# Patient Record
Sex: MALE | Race: BLACK OR AFRICAN AMERICAN | URBAN - METROPOLITAN AREA
[De-identification: names, ages, dates, MRNs, and addresses within clinical notes are randomized per-mention and may not be internally consistent; named-entity substitution may affect disease eponyms.]

---
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
## 2019-01-28 DIAGNOSIS — F90.9 ATTENTION DEFICIT HYPERACTIVITY DISORDER (ADHD), UNSPECIFIED ADHD TYPE: ICD-10-CM

## 2019-01-28 RX ORDER — DEXTROAMPHETAMINE SACCHARATE, AMPHETAMINE ASPARTATE MONOHYDRATE, DEXTROAMPHETAMINE SULFATE AND AMPHETAMINE SULFATE 7.5; 7.5; 7.5; 7.5 MG/1; MG/1; MG/1; MG/1
CAPSULE, EXTENDED RELEASE ORAL
Qty: 30 CAP | Refills: 0 | Status: SHIPPED | OUTPATIENT
Start: 2019-01-28 | End: 2019-02-28 | Stop reason: SDUPTHER

## 2019-01-28 NOTE — TELEPHONE ENCOUNTER
Called patient left name and call back number. Adderall refilled and prescription printed. Please call and the patient.

## 2019-02-28 DIAGNOSIS — F90.9 ATTENTION DEFICIT HYPERACTIVITY DISORDER (ADHD), UNSPECIFIED ADHD TYPE: ICD-10-CM

## 2019-02-28 RX ORDER — DEXTROAMPHETAMINE SACCHARATE, AMPHETAMINE ASPARTATE MONOHYDRATE, DEXTROAMPHETAMINE SULFATE AND AMPHETAMINE SULFATE 7.5; 7.5; 7.5; 7.5 MG/1; MG/1; MG/1; MG/1
CAPSULE, EXTENDED RELEASE ORAL
Qty: 30 CAP | Refills: 0 | Status: SHIPPED | OUTPATIENT
Start: 2019-02-28 | End: 2019-04-05 | Stop reason: SDUPTHER

## 2019-04-04 DIAGNOSIS — F90.9 ATTENTION DEFICIT HYPERACTIVITY DISORDER (ADHD), UNSPECIFIED ADHD TYPE: ICD-10-CM

## 2019-04-04 RX ORDER — DEXTROAMPHETAMINE SACCHARATE, AMPHETAMINE ASPARTATE MONOHYDRATE, DEXTROAMPHETAMINE SULFATE AND AMPHETAMINE SULFATE 7.5; 7.5; 7.5; 7.5 MG/1; MG/1; MG/1; MG/1
CAPSULE, EXTENDED RELEASE ORAL
Qty: 30 CAP | Refills: 0 | Status: CANCELLED | OUTPATIENT
Start: 2019-04-04

## 2019-04-04 NOTE — TELEPHONE ENCOUNTER
Requested Prescriptions Pending Prescriptions Disp Refills  amphetamine-dextroamphetamine XR (ADDERALL XR) 30 mg XR capsule 30 Cap 0 Sig: TAKE 1 CAPSULE BY MOUTH EVERY MORNING An appt has been scheduled to see Jada Solano 4/12

## 2019-04-05 RX ORDER — DEXTROAMPHETAMINE SACCHARATE, AMPHETAMINE ASPARTATE MONOHYDRATE, DEXTROAMPHETAMINE SULFATE AND AMPHETAMINE SULFATE 7.5; 7.5; 7.5; 7.5 MG/1; MG/1; MG/1; MG/1
CAPSULE, EXTENDED RELEASE ORAL
Qty: 30 CAP | Refills: 0 | Status: SHIPPED | OUTPATIENT
Start: 2019-04-05 | End: 2019-05-08 | Stop reason: SDUPTHER

## 2019-04-12 ENCOUNTER — OFFICE VISIT (OUTPATIENT)
Dept: FAMILY MEDICINE CLINIC | Age: 19
End: 2019-04-12

## 2019-04-12 VITALS
HEIGHT: 65 IN | RESPIRATION RATE: 20 BRPM | HEART RATE: 80 BPM | DIASTOLIC BLOOD PRESSURE: 81 MMHG | BODY MASS INDEX: 33.82 KG/M2 | WEIGHT: 203 LBS | SYSTOLIC BLOOD PRESSURE: 121 MMHG | OXYGEN SATURATION: 99 % | TEMPERATURE: 98.7 F

## 2019-04-12 DIAGNOSIS — F90.9 ATTENTION DEFICIT HYPERACTIVITY DISORDER (ADHD), UNSPECIFIED ADHD TYPE: Primary | ICD-10-CM

## 2019-04-12 NOTE — PROGRESS NOTES
Chief Complaint Patient presents with  Behavioral Problem 1. Have you been to the ER, urgent care clinic since your last visit? Hospitalized since your last visit?no 2. Have you seen or consulted any other health care providers outside of the [address] [place] [address] since your last visit? Include any pap smears or colon screening.  No

## 2019-04-12 NOTE — PROGRESS NOTES
PRANAV 
Hermes Rain. is a 22 y.o. male who presents today to establish care with new provider for treatment of ADHD follow up. Pt has been taking Adderall XR 30 mg daily in the morning and 5 mg in the afternoon as needed daily. Pt state he has been focusing at work and has not had any issues completing task. Denies chest pain, SOB, loss of appetite, rash and trouble sleeping. No refills needed today per patient. Current Outpatient Medications Medication Sig Dispense Refill  amphetamine-dextroamphetamine XR (ADDERALL XR) 30 mg XR capsule TAKE 1 CAPSULE BY MOUTH EVERY MORNING 30 Cap 0  
 dextroamphetamine-amphetamine (ADDERALL) 5 mg tablet TAKE 1 TABLET BY MOUTH AT 11:00AM 30 Tab 0 No Known Allergies SUBJECTIVE: 
Review of Systems Constitutional: Negative for chills, fever and malaise/fatigue. Eyes: Negative for blurred vision. Respiratory: Negative for shortness of breath. Cardiovascular: Negative for chest pain, palpitations and claudication. Gastrointestinal: Negative for abdominal pain, diarrhea, nausea and vomiting. Musculoskeletal: Negative for myalgias. Skin: Negative for rash. Neurological: Negative for dizziness, tingling, sensory change and headaches. Psychiatric/Behavioral: Negative for suicidal ideas. The patient is not nervous/anxious and does not have insomnia. OBJECTIVE: 
Visit Vitals /81 (BP 1 Location: Left arm, BP Patient Position: Sitting) Pulse 80 Temp 98.7 °F (37.1 °C) (Oral) Resp 20 Ht 5' 5\" (1.651 m) Wt 203 lb (92.1 kg) SpO2 99% BMI 33.78 kg/m² I have reviewed/discussed the above normal BMI with the patient. I have recommended the following interventions: dietary management education, guidance, and counseling, encourage exercise and monitor weight . Physical Exam  
Constitutional: He is oriented to person, place, and time and well-developed, well-nourished, and in no distress.   
HENT:  
 Head: Normocephalic. Eyes: Pupils are equal, round, and reactive to light. Conjunctivae and EOM are normal.  
Neck: Normal range of motion. Neck supple. No thyromegaly present. Cardiovascular: Normal rate, regular rhythm and normal heart sounds. Pulmonary/Chest: Effort normal and breath sounds normal. He has no wheezes. He has no rales. He exhibits no tenderness. Neurological: He is alert and oriented to person, place, and time. Gait normal.  
Skin: Skin is warm and dry. No erythema. Psychiatric: Mood and affect normal.  
Nursing note and vitals reviewed. ASSESSMENT: 
Diagnoses and all orders for this visit: 1. Attention deficit hyperactivity disorder (ADHD), unspecified ADHD type PLAN: 
Continue current medication regimen I have discussed the diagnosis with the patient and the intended plan as seen in the above orders. The patient has received an after-visit summary and questions were answered concerning future plans. I have discussed medication side effects and warnings with the patient as well. Patient will call for further questions. Follow-up and Dispositions · Return in about 4 months (around 8/12/2019) for ADHD.  
  
 
Shae Morrison NP

## 2019-04-12 NOTE — PATIENT INSTRUCTIONS
Attention Deficit Hyperactivity Disorder (ADHD) in Adults: Care Instructions Your Care Instructions Attention deficit hyperactivity disorder, or ADHD, is a condition that makes it hard to pay attention. So you may have problems when you try to focus, get organized, and finish tasks. It might make you more active than other people. Or you might do things without thinking first. 
ADHD is very common. It usually starts in early childhood. Many adults don't realize they have it until their children are diagnosed. Then they become aware of their own symptoms. Doctors don't know what causes ADHD. But it often runs in families. ADHD can be treated with medicines, behavior training, and counseling. Treatment can improve your life. Follow-up care is a key part of your treatment and safety. Be sure to make and go to all appointments, and call your doctor if you are having problems. It's also a good idea to know your test results and keep a list of the medicines you take. How can you care for yourself at home? · Learn all you can about ADHD. This will help you and your family understand it better. · Take your medicines exactly as prescribed. Call your doctor if you think you are having a problem with your medicine. You will get more details on the specific medicines your doctor prescribes. · If you miss a dose of your medicine, do not take an extra dose. · If your doctor suggests counseling, find a counselor you like and trust. Talk openly and honestly. Be willing to make some changes. · Find a support group for adults with ADHD. Talking to others with the same problems can help you feel better. It can also give you ideas about how to best cope with the condition. · Get rid of distractions at your work space. Keep your desk clean. Try not to face a window or busy hallway. · Use files, planners, and other tools to keep you organized. · Limit use of alcohol, and do not use illegal drugs.  People with ADHD tend to become addicted more easily than others. Tell your doctor if you need help to quit. Counseling, support groups, and sometimes medicines can help you stay free of alcohol or drugs. · Get at least 30 minutes of physical activity on most days of the week. Exercise has been shown to help people cope with ADHD. Walking is a good choice. You also may want to do other activities, such as running, swimming, cycling, or playing tennis or team sports. When should you call for help? Watch closely for changes in your health, and be sure to contact your doctor if: 
  · You feel sad a lot or cry all the time.  
  · You have trouble sleeping, or you sleep too much.  
  · You find it hard to concentrate, make decisions, or remember things.  
  · You change how you normally eat.  
  · You feel guilty for no reason. Where can you learn more? Go to http://trevor-blanca.info/. Enter B196 in the search box to learn more about \"Attention Deficit Hyperactivity Disorder (ADHD) in Adults: Care Instructions. \" Current as of: September 11, 2018 Content Version: 11.9 © 9973-1656 [employer]. Care instructions adapted under license by [employer] (which disclaims liability or warranty for this information). If you have questions about a medical condition or this instruction, always ask your healthcare professional. [place] [address] any warranty or liability for your use of this information.

## 2019-05-08 DIAGNOSIS — F90.9 ATTENTION DEFICIT HYPERACTIVITY DISORDER (ADHD), UNSPECIFIED ADHD TYPE: ICD-10-CM

## 2019-05-14 ENCOUNTER — TELEPHONE (OUTPATIENT)
Dept: FAMILY MEDICINE CLINIC | Age: 19
End: 2019-05-14

## 2019-05-14 RX ORDER — DEXTROAMPHETAMINE SACCHARATE, AMPHETAMINE ASPARTATE, DEXTROAMPHETAMINE SULFATE AND AMPHETAMINE SULFATE 1.25; 1.25; 1.25; 1.25 MG/1; MG/1; MG/1; MG/1
TABLET ORAL
Qty: 30 TAB | Refills: 0 | Status: SHIPPED | OUTPATIENT
Start: 2019-05-14 | End: 2019-08-19 | Stop reason: SDUPTHER

## 2019-05-14 RX ORDER — DEXTROAMPHETAMINE SACCHARATE, AMPHETAMINE ASPARTATE MONOHYDRATE, DEXTROAMPHETAMINE SULFATE AND AMPHETAMINE SULFATE 7.5; 7.5; 7.5; 7.5 MG/1; MG/1; MG/1; MG/1
CAPSULE, EXTENDED RELEASE ORAL
Qty: 30 CAP | Refills: 0 | Status: SHIPPED | OUTPATIENT
Start: 2019-05-14 | End: 2019-06-17 | Stop reason: SDUPTHER

## 2019-06-06 ENCOUNTER — OFFICE VISIT (OUTPATIENT)
Dept: FAMILY MEDICINE CLINIC | Age: 19
End: 2019-06-06

## 2019-06-06 VITALS
HEIGHT: 65 IN | WEIGHT: 208.6 LBS | TEMPERATURE: 98.2 F | BODY MASS INDEX: 34.75 KG/M2 | DIASTOLIC BLOOD PRESSURE: 83 MMHG | OXYGEN SATURATION: 99 % | HEART RATE: 87 BPM | SYSTOLIC BLOOD PRESSURE: 122 MMHG | RESPIRATION RATE: 18 BRPM

## 2019-06-06 DIAGNOSIS — L55.9 BURN FROM THE SUN: Primary | ICD-10-CM

## 2019-06-06 RX ORDER — HYDROCORTISONE 25 MG/G
CREAM TOPICAL 2 TIMES DAILY
Qty: 30 G | Refills: 0 | Status: SHIPPED | OUTPATIENT
Start: 2019-06-06 | End: 2022-07-26 | Stop reason: ALTCHOICE

## 2019-06-06 NOTE — PATIENT INSTRUCTIONS
Sunburn: Care Instructions  Your Care Instructions  A sunburn is skin damage from the sun's ultraviolet (UV) rays. Most sunburns cause mild pain and redness but affect only the outer layer of skin. These are called first-degree burns. The red skin might hurt when you touch it. These sunburns are mild and can usually be treated at home. Skin that is red and painful and that swells up and blisters may mean that deep skin layers and nerve endings have been damaged. These are second-degree burns. This type of sunburn is usually more painful and takes longer to heal.  Follow-up care is a key part of your treatment and safety. Be sure to make and go to all appointments, and call your doctor if you are having problems. It's also a good idea to know your test results and keep a list of the medicines you take. How can you care for yourself at home? · Use cool cloths on the sunburned areas. · Take cool showers or baths often. · Apply soothing lotions with aloe vera to sunburned areas. Do not apply lotions to blistered skin. · A sunburn can cause a mild fever and a headache. Lie down in a cool, quiet room to relieve the headache. A headache may be caused by not getting enough fluids, which is called dehydration, so drinking fluids may help. · Take anti-inflammatory medicines to reduce pain, swelling, and fever. These include ibuprofen (Advil, Motrin) and naproxen (Aleve). Read and follow all instructions on the label. · Use lotion to relieve the itching when your skin peels. There is nothing you can do to stop skin from peeling after a sunburn. It is part of the healing process. · Protect your skin from the sun with sunscreen, hats with wide brims, sunglasses, and loose-fitting, tightly woven clothing that covers your arms and legs. Caring for blisters  Small blisters usually heal on their own. · Do not try to break the blisters. Just leave them alone.   · Do not cover the blisters unless something such as clothing is rubbing against them. If you do cover them, apply a loose bandage. You can use tape to hold the bandage on, but do not let the tape touch the blisters. · Avoid wearing clothes or shoes or doing activities that rub or irritate the blisters until they have healed. Larger blisters, which are the size of a nickel or larger, usually heal without problems. · If you have a large blister, you can consider draining it, unless your doctor told you not to. Clean a needle with rubbing alcohol or soap and water, then use it to gently puncture the edge of the blister. Press the fluid in the blister toward the hole you made. Wash the blister after you have drained it, and pat it dry with clean gauze. · Do not remove the flap of skin covering the blister unless it tears or gets dirty or pus forms under it. The flap protects the healing skin underneath. · Put a thin layer of petroleum jelly on the bandage before you apply it. This will keep the bandage from sticking to the blister. Do not use alcohol or iodine on the blister because these may make the blister heal more slowly. · Loosely apply a bandage or gauze. You can use tape to hold the bandage on, but do not let the tape touch the blister. Do not wrap tape completely around a hand, arm, foot, or leg because it could cut off the blood supply if the limb swells. If the tape is too tight, you may develop numbness, tingling, pain, or cool and pale or swollen skin below the tape. · Change the bandage every day and any time it gets wet or dirty. You can soak the bandage in cool water just before removing it to make it less painful to take off. · Avoid wearing clothes or shoes or doing activities that rub or irritate the blisters until they have healed. When should you call for help? Call your doctor now or seek immediate medical care if:    · You have signs of needing more fluids.  You have sunken eyes and a dry mouth, and you pass only a little dark urine.     · You have signs of infection, such as:  ? Increased pain, swelling, warmth, or redness. ? Red streaks leading from the area. ? Pus draining from the area. ? A fever.    Watch closely for changes in your health, and be sure to contact your doctor if:    · You do not get better as expected. Where can you learn more? Go to http://trevor-blanca.info/. Enter P935 in the search box to learn more about \"Sunburn: Care Instructions. \"  Current as of: September 23, 2018  Content Version: 11.9  © 5744-9016 [employer]. Care instructions adapted under license by [employer] (which disclaims liability or warranty for this information). If you have questions about a medical condition or this instruction, always ask your healthcare professional. [address] any warranty or liability for your use of this information.

## 2019-06-06 NOTE — PROGRESS NOTES
HPI  Jessica Wayne is a 22 y.o. male who presents today for rash. 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 Pt states on Friday he forget to apply sun screen he noticed redden areas to his thighs, hands and forearms the following day with some red bumps and itchiness. Pt notes when he was routine applying the sunscreen he did not have any heat rashes prior to Saturday June 1, 2019 and no peeling to site. Pt has not has not had any headaches, blurred vision and dizziness. Current Outpatient Medications   Medication Sig Dispense Refill    dextroamphetamine-amphetamine (ADDERALL) 5 mg tablet TAKE 1 TABLET BY MOUTH AT 11:00AM 30 Tab 0    amphetamine-dextroamphetamine XR (ADDERALL XR) 30 mg XR capsule TAKE 1 CAPSULE BY MOUTH EVERY MORNING 30 Cap 0      No Known Allergies    SUBJECTIVE:  Review of Systems   Constitutional: Negative for chills, fever and malaise/fatigue. Eyes: Negative for blurred vision. Respiratory: Negative for shortness of breath. Cardiovascular: Negative for chest pain, palpitations and leg swelling. Gastrointestinal: Negative for abdominal pain, diarrhea, nausea and vomiting. Musculoskeletal: Negative for myalgias. Skin: Positive for itching and rash. Neurological: Negative for dizziness, tingling, sensory change and headaches. OBJECTIVE:  Visit Vitals  /83 (BP 1 Location: Left arm, BP Patient Position: Sitting)   Pulse 87   Temp 98.2 °F (36.8 °C) (Oral)   Resp 18   Ht 5' 5\" (1.651 m)   Wt 208 lb 9.6 oz (94.6 kg)   SpO2 99%   BMI 34.71 kg/m²      I have reviewed/discussed the above normal BMI with the patient. I have recommended the following interventions: dietary management education, guidance, and counseling, encourage exercise and monitor weight .        Physical Exam   Constitutional: He is oriented to person, place, and time and well-developed, well-nourished, and in no distress. HENT:   Head: Normocephalic. Eyes: Pupils are equal, round, and reactive to light. Conjunctivae and EOM are normal.   Cardiovascular: Normal rate, regular rhythm and normal heart sounds. Pulmonary/Chest: Effort normal and breath sounds normal. He has no wheezes. He has no rales. He exhibits no tenderness. Musculoskeletal: He exhibits no edema. Neurological: He is alert and oriented to person, place, and time. Gait normal.   Skin: Skin is warm and dry. Rash noted. There is erythema. Nursing note and vitals reviewed. ASSESSMENT:  Diagnoses and all orders for this visit:    1. Burn from the sun  -     hydrocortisone (HYTONE) 2.5 % topical cream; Apply  to affected area two (2) times a day. use thin layer    PLAN:  Encouraged patient to apply sunscreen daily  Encouraged patient to apply aloe vera to sunburned areas  Start Hydrocortisone topical cream, apply at night to avoid sun exposure and improve rash    I have discussed the diagnosis with the patient and the intended plan as seen in the above orders. The patient has received an after-visit summary and questions were answered concerning future plans. I have discussed medication side effects and warnings with the patient as well. Patient will call for further questions. Follow-up and Dispositions    · Return in about 2 weeks (around 6/20/2019), or if symptoms worsen or fail to improve.        Eric Alexander NP

## 2019-06-17 DIAGNOSIS — F90.9 ATTENTION DEFICIT HYPERACTIVITY DISORDER (ADHD), UNSPECIFIED ADHD TYPE: ICD-10-CM

## 2019-06-18 RX ORDER — DEXTROAMPHETAMINE SACCHARATE, AMPHETAMINE ASPARTATE MONOHYDRATE, DEXTROAMPHETAMINE SULFATE AND AMPHETAMINE SULFATE 7.5; 7.5; 7.5; 7.5 MG/1; MG/1; MG/1; MG/1
CAPSULE, EXTENDED RELEASE ORAL
Qty: 30 CAP | Refills: 0 | Status: SHIPPED | OUTPATIENT
Start: 2019-06-18 | End: 2019-07-29 | Stop reason: SDUPTHER

## 2019-07-29 DIAGNOSIS — F90.9 ATTENTION DEFICIT HYPERACTIVITY DISORDER (ADHD), UNSPECIFIED ADHD TYPE: ICD-10-CM

## 2019-08-01 ENCOUNTER — TELEPHONE (OUTPATIENT)
Dept: FAMILY MEDICINE CLINIC | Age: 19
End: 2019-08-01

## 2019-08-01 RX ORDER — DEXTROAMPHETAMINE SACCHARATE, AMPHETAMINE ASPARTATE MONOHYDRATE, DEXTROAMPHETAMINE SULFATE AND AMPHETAMINE SULFATE 7.5; 7.5; 7.5; 7.5 MG/1; MG/1; MG/1; MG/1
CAPSULE, EXTENDED RELEASE ORAL
Qty: 30 CAP | Refills: 0 | Status: SHIPPED | OUTPATIENT
Start: 2019-08-01 | End: 2019-08-19 | Stop reason: SDUPTHER

## 2019-08-01 NOTE — TELEPHONE ENCOUNTER
Attempted to contact patient to inform him the prescription is at the . Left message with call back number.

## 2019-08-01 NOTE — TELEPHONE ENCOUNTER
Pt calling checking on status of refill. Would like to know if this can be approved today. pls advise

## 2019-08-19 ENCOUNTER — TELEPHONE (OUTPATIENT)
Dept: FAMILY MEDICINE CLINIC | Age: 19
End: 2019-08-19

## 2019-08-19 ENCOUNTER — OFFICE VISIT (OUTPATIENT)
Dept: FAMILY MEDICINE CLINIC | Age: 19
End: 2019-08-19

## 2019-08-19 VITALS
BODY MASS INDEX: 36.82 KG/M2 | RESPIRATION RATE: 18 BRPM | HEART RATE: 84 BPM | DIASTOLIC BLOOD PRESSURE: 82 MMHG | HEIGHT: 65 IN | OXYGEN SATURATION: 99 % | SYSTOLIC BLOOD PRESSURE: 120 MMHG | WEIGHT: 221 LBS | TEMPERATURE: 98.3 F

## 2019-08-19 DIAGNOSIS — J01.00 ACUTE MAXILLARY SINUSITIS, RECURRENCE NOT SPECIFIED: ICD-10-CM

## 2019-08-19 DIAGNOSIS — F90.9 ATTENTION DEFICIT HYPERACTIVITY DISORDER (ADHD), UNSPECIFIED ADHD TYPE: Primary | ICD-10-CM

## 2019-08-19 PROBLEM — E66.01 SEVERE OBESITY (HCC): Status: ACTIVE | Noted: 2019-08-19

## 2019-08-19 RX ORDER — DEXTROAMPHETAMINE SACCHARATE, AMPHETAMINE ASPARTATE MONOHYDRATE, DEXTROAMPHETAMINE SULFATE AND AMPHETAMINE SULFATE 7.5; 7.5; 7.5; 7.5 MG/1; MG/1; MG/1; MG/1
CAPSULE, EXTENDED RELEASE ORAL
Qty: 30 CAP | Refills: 0 | Status: SHIPPED | OUTPATIENT
Start: 2019-09-02 | End: 2019-08-19 | Stop reason: SDUPTHER

## 2019-08-19 RX ORDER — AMOXICILLIN AND CLAVULANATE POTASSIUM 875; 125 MG/1; MG/1
1 TABLET, FILM COATED ORAL EVERY 12 HOURS
Qty: 20 TAB | Refills: 0 | Status: SHIPPED | OUTPATIENT
Start: 2019-08-19 | End: 2019-08-29

## 2019-08-19 RX ORDER — DEXTROAMPHETAMINE SACCHARATE, AMPHETAMINE ASPARTATE, DEXTROAMPHETAMINE SULFATE AND AMPHETAMINE SULFATE 1.25; 1.25; 1.25; 1.25 MG/1; MG/1; MG/1; MG/1
TABLET ORAL
Qty: 30 TAB | Refills: 0 | Status: SHIPPED | OUTPATIENT
Start: 2019-10-02 | End: 2019-12-12 | Stop reason: SDUPTHER

## 2019-08-19 RX ORDER — DEXTROAMPHETAMINE SACCHARATE, AMPHETAMINE ASPARTATE MONOHYDRATE, DEXTROAMPHETAMINE SULFATE AND AMPHETAMINE SULFATE 7.5; 7.5; 7.5; 7.5 MG/1; MG/1; MG/1; MG/1
CAPSULE, EXTENDED RELEASE ORAL
Qty: 30 CAP | Refills: 0 | Status: SHIPPED | OUTPATIENT
Start: 2019-10-02 | End: 2019-11-07 | Stop reason: SDUPTHER

## 2019-08-19 RX ORDER — DEXTROAMPHETAMINE SACCHARATE, AMPHETAMINE ASPARTATE, DEXTROAMPHETAMINE SULFATE AND AMPHETAMINE SULFATE 1.25; 1.25; 1.25; 1.25 MG/1; MG/1; MG/1; MG/1
TABLET ORAL
Qty: 30 TAB | Refills: 0 | Status: SHIPPED | OUTPATIENT
Start: 2019-09-02 | End: 2019-08-19 | Stop reason: SDUPTHER

## 2019-08-19 NOTE — LETTER
NOTIFICATION RETURN TO WORK / SCHOOL 
 
8/20/2019 9:11 AM 
 
Mr. Alessandro Samaniego. [address] [place] [id number] To Whom It May Concern: 
 
Alessandro Samaniego. [facility] He will return to work/school on: 8/21/2019 If there are questions or concerns please have the patient contact our office. Sincerely, Donivan Kehr, NP

## 2019-08-19 NOTE — PROGRESS NOTES
HPI  Jakob Flores. is a 22 y.o. male who presents today for ADHD follow up and URI symptoms. Pt has been having congestion, productive cough, cough described as productive of green sputum, headache, bilateral sinus pain and bilateral ear pain for 4 days. Pt denies a history of chest pain, chills, dizziness, fevers, myalgias, nausea, shortness of breath, vomiting and wheezing and denies a history of asthma. Patient denies smoke cigarettes. Pt has tried Delsym for symptoms and notes little to no improvement. Current Outpatient Medications   Medication Sig Dispense Refill    amphetamine-dextroamphetamine XR (ADDERALL XR) 30 mg XR capsule TAKE 1 CAPSULE BY MOUTH EVERY MORNING 30 Cap 0    hydrocortisone (HYTONE) 2.5 % topical cream Apply  to affected area two (2) times a day. use thin layer 30 g 0    dextroamphetamine-amphetamine (ADDERALL) 5 mg tablet TAKE 1 TABLET BY MOUTH AT 11:00AM 30 Tab 0      No Known Allergies    SUBJECTIVE:  Review of Systems   Constitutional: Negative for chills, fever and malaise/fatigue. HENT: Positive for congestion, ear pain and sinus pain. Negative for ear discharge and sore throat. Eyes: Negative for pain. Respiratory: Positive for cough and sputum production. Negative for shortness of breath and wheezing. Cardiovascular: Negative for chest pain, palpitations and leg swelling. Gastrointestinal: Negative for abdominal pain, diarrhea, nausea and vomiting. Musculoskeletal: Negative for myalgias. Neurological: Positive for headaches. Negative for dizziness, tingling and sensory change. OBJECTIVE:  Visit Vitals  /82 (BP 1 Location: Left arm, BP Patient Position: Sitting)   Pulse 84   Temp 98.3 °F (36.8 °C) (Oral)   Resp 18   Ht 5' 5\" (1.651 m)   Wt 221 lb (100.2 kg)   SpO2 99%   BMI 36.78 kg/m²      I have reviewed/discussed the above normal BMI with the patient.   I have recommended the following interventions: dietary management education, guidance, and counseling and encourage exercise . Darby Abrams Physical Exam   Constitutional: He is oriented to person, place, and time and well-developed, well-nourished, and in no distress. HENT:   Head: Normocephalic. Right Ear: Hearing, tympanic membrane, external ear and ear canal normal.   Left Ear: Hearing, tympanic membrane, external ear and ear canal normal.   Nose: No mucosal edema or rhinorrhea. Right sinus exhibits maxillary sinus tenderness. Right sinus exhibits no frontal sinus tenderness. Left sinus exhibits maxillary sinus tenderness. Left sinus exhibits no frontal sinus tenderness. Mouth/Throat: Uvula is midline. Posterior oropharyngeal erythema present. No posterior oropharyngeal edema. Eyes: Pupils are equal, round, and reactive to light. Conjunctivae and EOM are normal.   Neck: No thyromegaly present. Cardiovascular: Normal rate, regular rhythm and normal heart sounds. Pulmonary/Chest: Effort normal and breath sounds normal. He has no wheezes. He has no rales. He exhibits no tenderness. Cough present   Neurological: He is alert and oriented to person, place, and time. Gait normal.   Skin: Skin is warm and dry. No erythema. Psychiatric: Mood and affect normal.   Nursing note and vitals reviewed. ASSESSMENT:  Diagnoses and all orders for this visit:    1. Attention deficit hyperactivity disorder (ADHD), unspecified ADHD type  -     amphetamine-dextroamphetamine XR (ADDERALL XR) 30 mg XR capsule; TAKE 1 CAPSULE BY MOUTH EVERY MORNING  -     dextroamphetamine-amphetamine (ADDERALL) 5 mg tablet; TAKE 1 TABLET BY MOUTH AT 11:00AM    2. Acute maxillary sinusitis, recurrence not specified  -     amoxicillin-clavulanate (AUGMENTIN) 875-125 mg per tablet; Take 1 Tab by mouth every twelve (12) hours for 10 days.     PLAN:  Start Augmentin BID for 10 days  Medication refills on Adderall for September and October  Symptomatic therapy suggested: push fluids, rest, use vaporizer or mist prn, use acetaminophen prn and return office visit prn if symptoms persist or worsen. I have discussed the diagnosis with the patient and the intended plan as seen in the above orders. The patient has received an after-visit summary and questions were answered concerning future plans. I have discussed medication side effects and warnings with the patient as well. Patient will call for further questions. Follow-up and Dispositions    · Return in about 2 weeks (around 9/2/2019), or if symptoms worsen or fail to improve.        Holly Estrada NP

## 2019-08-19 NOTE — TELEPHONE ENCOUNTER
Patient calling asking if he can have a work note to excuse him from work until [facility].      pls advise

## 2019-08-19 NOTE — PROGRESS NOTES
Chief Complaint   Patient presents with    Behavioral Problem     1. Have you been to the ER, urgent care clinic since your last visit? Hospitalized since your last visit? No    2. Have you seen or consulted any other health care providers outside of the [address] [place] [address] since your last visit? Include any pap smears or colon screening.  No

## 2019-08-20 NOTE — TELEPHONE ENCOUNTER
Pt calling for status of work note, to return to work on Wednesday, 08/21/2019. He did not feel well enough to return to work today.  He is scheduled to be at work today at 11 AM    He stated he will stop by the office this morning and is hopeful work note will be ready

## 2019-09-03 ENCOUNTER — OFFICE VISIT (OUTPATIENT)
Dept: FAMILY MEDICINE CLINIC | Age: 19
End: 2019-09-03

## 2019-09-03 VITALS
HEART RATE: 92 BPM | HEIGHT: 65 IN | RESPIRATION RATE: 18 BRPM | BODY MASS INDEX: 36.65 KG/M2 | TEMPERATURE: 98.3 F | DIASTOLIC BLOOD PRESSURE: 87 MMHG | SYSTOLIC BLOOD PRESSURE: 131 MMHG | OXYGEN SATURATION: 97 % | WEIGHT: 220 LBS

## 2019-09-03 DIAGNOSIS — R05.9 COUGH: Primary | ICD-10-CM

## 2019-09-03 RX ORDER — BENZONATATE 200 MG/1
200 CAPSULE ORAL
Qty: 21 CAP | Refills: 0 | Status: SHIPPED | OUTPATIENT
Start: 2019-09-03 | End: 2019-09-10

## 2019-09-03 NOTE — PATIENT INSTRUCTIONS
Cough: Care Instructions  Your Care Instructions    A cough is your body's response to something that bothers your throat or airways. Many things can cause a cough. You might cough because of a cold or the flu, bronchitis, or asthma. Smoking, postnasal drip, allergies, and stomach acid that backs up into your throat also can cause coughs. A cough is a symptom, not a disease. Most coughs stop when the cause, such as a cold, goes away. You can take a few steps at home to cough less and feel better. Follow-up care is a key part of your treatment and safety. Be sure to make and go to all appointments, and call your doctor if you are having problems. It's also a good idea to know your test results and keep a list of the medicines you take. How can you care for yourself at home? · Drink lots of water and other fluids. This helps thin the mucus and soothes a dry or sore throat. Honey or lemon juice in hot water or tea may ease a dry cough. · Take cough medicine as directed by your doctor. · Prop up your head on pillows to help you breathe and ease a dry cough. · Try cough drops to soothe a dry or sore throat. Cough drops don't stop a cough. Medicine-flavored cough drops are no better than candy-flavored drops or hard candy. · Do not smoke. Avoid secondhand smoke. If you need help quitting, talk to your doctor about stop-smoking programs and medicines. These can increase your chances of quitting for good. When should you call for help? Call 911 anytime you think you may need emergency care.  For example, call if:    · You have severe trouble breathing.    Call your doctor now or seek immediate medical care if:    · You cough up blood.     · You have new or worse trouble breathing.     · You have a new or higher fever.     · You have a new rash.    Watch closely for changes in your health, and be sure to contact your doctor if:    · You cough more deeply or more often, especially if you notice more mucus or a change in the color of your mucus.     · You have new symptoms, such as a sore throat, an earache, or sinus pain.     · You do not get better as expected. Where can you learn more? Go to http://trevor-blanca.info/. Enter D279 in the search box to learn more about \"Cough: Care Instructions. \"  Current as of: September 5, 2018  Content Version: 12.1  © 1806-8965 [employer]. Care instructions adapted under license by [employer] (which disclaims liability or warranty for this information). If you have questions about a medical condition or this instruction, always ask your healthcare professional. [address] any warranty or liability for your use of this information.

## 2019-09-03 NOTE — LETTER
NOTIFICATION RETURN TO WORK / SCHOOL 
 
9/3/2019 8:23 AM 
 
Mr. Charmayne Freestone. [address] [place], [place] [id number] To Whom It May Concern: 
 
Charmayne Freestone. [facility] the [facility] He will return to work/school on: 9/3/2019 If there are questions or concerns please have the patient contact our office. Sincerely, Mellissa Domínguez NP

## 2019-09-03 NOTE — PROGRESS NOTES
Chief Complaint   Patient presents with    Cough     only at night     1. Have you been to the ER, urgent care clinic since your last visit? Hospitalized since your last visit? No    2. Have you seen or consulted any other health care providers outside of the [address] [place] [address] since your last visit? Include any pap smears or colon screening.  No

## 2019-09-09 NOTE — PROGRESS NOTES
PRANAV Morrison. is a 20 y.o. male who presents today for follow up. Pt was treated on August 19, 2019 for sinus infection, pt completed a 10 day antibiotic course and notes some improvement to symptoms. Pt states now he has been having a daily persistent nonproductive cough. The cough is worst at night. He notes some improvement with Tessalon Pearles and would like a prescription for this medication today. Current Outpatient Medications   Medication Sig Dispense Refill    benzonatate (TESSALON) 200 mg capsule Take 1 Cap by mouth three (3) times daily as needed for Cough for up to 7 days. 21 Cap 0    [START ON 10/2/2019] amphetamine-dextroamphetamine XR (ADDERALL XR) 30 mg XR capsule TAKE 1 CAPSULE BY MOUTH EVERY MORNING 30 Cap 0    hydrocortisone (HYTONE) 2.5 % topical cream Apply  to affected area two (2) times a day. use thin layer 30 g 0    [START ON 10/2/2019] dextroamphetamine-amphetamine (ADDERALL) 5 mg tablet TAKE 1 TABLET BY MOUTH AT 11:00AM 30 Tab 0      No Known Allergies    SUBJECTIVE  Review of Systems   Constitutional: Negative for chills, fever and malaise/fatigue. HENT: Negative for congestion, ear pain, sinus pain and sore throat. Respiratory: Positive for cough. Negative for sputum production, shortness of breath and wheezing. Cardiovascular: Negative for chest pain, palpitations and leg swelling. Gastrointestinal: Negative for abdominal pain, diarrhea, nausea and vomiting. Musculoskeletal: Negative for myalgias. Neurological: Negative for dizziness, tingling, sensory change and headaches. OBJECTIVE:  Visit Vitals  /87 (BP 1 Location: Left arm, BP Patient Position: Sitting)   Pulse 92   Temp 98.3 °F (36.8 °C) (Oral)   Resp 18   Ht 5' 5\" (1.651 m)   Wt 220 lb (99.8 kg)   SpO2 97%   BMI 36.61 kg/m²      I have reviewed/discussed the above normal BMI with the patient.   I have recommended the following interventions: dietary management education, guidance, and counseling, encourage exercise and monitor weight . Physical Exam   Constitutional: He is oriented to person, place, and time and well-developed, well-nourished, and in no distress. HENT:   Head: Normocephalic. Cardiovascular: Normal rate, regular rhythm and normal heart sounds. Pulmonary/Chest: Effort normal and breath sounds normal. He has no wheezes. He has no rales. He exhibits no tenderness. Cough present   Neurological: He is alert and oriented to person, place, and time. Gait normal.   Skin: Skin is warm and dry. No erythema. Psychiatric: Mood and affect normal.   Nursing note and vitals reviewed. ASSESSMENT:  Diagnoses and all orders for this visit:    1. Cough  -     benzonatate (TESSALON) 200 mg capsule; Take 1 Cap by mouth three (3) times daily as needed for Cough for up to 7 days. PLAN:  Start Tessalon Pearles PRN  Encouraged increased fluid intake     I have discussed the diagnosis with the patient and the intended plan as seen in the above orders. The patient has received an after-visit summary and questions were answered concerning future plans. I have discussed medication side effects and warnings with the patient as well. Patient will call for further questions. Follow-up and Dispositions    · Return in about 2 weeks (around 9/17/2019), or if symptoms worsen or fail to improve.        Donivan Kehr, NP

## 2019-09-24 PROBLEM — Z23 NEED FOR INFLUENZA VACCINATION: Status: RESOLVED | Noted: 2018-01-18 | Resolved: 2019-09-24

## 2019-11-07 DIAGNOSIS — F90.9 ATTENTION DEFICIT HYPERACTIVITY DISORDER (ADHD), UNSPECIFIED ADHD TYPE: ICD-10-CM

## 2019-11-07 RX ORDER — DEXTROAMPHETAMINE SACCHARATE, AMPHETAMINE ASPARTATE MONOHYDRATE, DEXTROAMPHETAMINE SULFATE AND AMPHETAMINE SULFATE 7.5; 7.5; 7.5; 7.5 MG/1; MG/1; MG/1; MG/1
CAPSULE, EXTENDED RELEASE ORAL
Qty: 30 CAP | Refills: 0 | Status: SHIPPED | OUTPATIENT
Start: 2019-11-07 | End: 2019-12-12 | Stop reason: SDUPTHER

## 2019-12-12 DIAGNOSIS — F90.9 ATTENTION DEFICIT HYPERACTIVITY DISORDER (ADHD), UNSPECIFIED ADHD TYPE: ICD-10-CM

## 2019-12-12 RX ORDER — DEXTROAMPHETAMINE SACCHARATE, AMPHETAMINE ASPARTATE, DEXTROAMPHETAMINE SULFATE AND AMPHETAMINE SULFATE 1.25; 1.25; 1.25; 1.25 MG/1; MG/1; MG/1; MG/1
TABLET ORAL
Qty: 30 TAB | Refills: 0 | Status: SHIPPED | OUTPATIENT
Start: 2019-12-12 | End: 2020-01-07 | Stop reason: SDUPTHER

## 2019-12-12 RX ORDER — DEXTROAMPHETAMINE SACCHARATE, AMPHETAMINE ASPARTATE MONOHYDRATE, DEXTROAMPHETAMINE SULFATE AND AMPHETAMINE SULFATE 7.5; 7.5; 7.5; 7.5 MG/1; MG/1; MG/1; MG/1
CAPSULE, EXTENDED RELEASE ORAL
Qty: 30 CAP | Refills: 0 | Status: SHIPPED | OUTPATIENT
Start: 2019-12-12 | End: 2020-01-07 | Stop reason: SDUPTHER

## 2020-01-06 ENCOUNTER — TELEPHONE (OUTPATIENT)
Dept: FAMILY MEDICINE CLINIC | Age: 20
End: 2020-01-06

## 2020-01-06 DIAGNOSIS — F90.9 ATTENTION DEFICIT HYPERACTIVITY DISORDER (ADHD), UNSPECIFIED ADHD TYPE: ICD-10-CM

## 2020-01-06 RX ORDER — DEXTROAMPHETAMINE SACCHARATE, AMPHETAMINE ASPARTATE MONOHYDRATE, DEXTROAMPHETAMINE SULFATE AND AMPHETAMINE SULFATE 7.5; 7.5; 7.5; 7.5 MG/1; MG/1; MG/1; MG/1
CAPSULE, EXTENDED RELEASE ORAL
Qty: 30 CAP | Status: CANCELLED | OUTPATIENT
Start: 2020-01-06

## 2020-01-06 NOTE — TELEPHONE ENCOUNTER
Spoke with patient he states he no longer needs immunization record from this office. Patient states he does need a refill on Adderall and needs to have brand name per his insurance.

## 2020-01-07 RX ORDER — DEXTROAMPHETAMINE SULFATE, DEXTROAMPHETAMINE SACCHARATE, AMPHETAMINE SULFATE AND AMPHETAMINE ASPARTATE 7.5; 7.5; 7.5; 7.5 MG/1; MG/1; MG/1; MG/1
30 CAPSULE, EXTENDED RELEASE ORAL
Qty: 30 CAP | Refills: 0 | Status: SHIPPED | OUTPATIENT
Start: 2020-01-07 | End: 2020-02-17 | Stop reason: SDUPTHER

## 2020-01-31 ENCOUNTER — HOSPITAL ENCOUNTER (OUTPATIENT)
Dept: LAB | Age: 20
Discharge: HOME | End: 2020-01-31
Payer: COMMERCIAL

## 2020-01-31 ENCOUNTER — OFFICE VISIT (OUTPATIENT)
Dept: FAMILY MEDICINE CLINIC | Age: 20
End: 2020-01-31

## 2020-01-31 VITALS
SYSTOLIC BLOOD PRESSURE: 133 MMHG | BODY MASS INDEX: 38.95 KG/M2 | OXYGEN SATURATION: 96 % | RESPIRATION RATE: 18 BRPM | HEART RATE: 104 BPM | WEIGHT: 233.8 LBS | DIASTOLIC BLOOD PRESSURE: 84 MMHG | HEIGHT: 65 IN | TEMPERATURE: 99.9 F

## 2020-01-31 DIAGNOSIS — Z02.0 SCHOOL PHYSICAL EXAM: Primary | ICD-10-CM

## 2020-01-31 DIAGNOSIS — Z11.1 SCREENING FOR TUBERCULOSIS: ICD-10-CM

## 2020-01-31 PROCEDURE — 36415 COLL VENOUS BLD VENIPUNCTURE: CPT

## 2020-01-31 PROCEDURE — 86735 MUMPS ANTIBODY: CPT

## 2020-01-31 PROCEDURE — 86706 HEP B SURFACE ANTIBODY: CPT

## 2020-01-31 PROCEDURE — 86787 VARICELLA-ZOSTER ANTIBODY: CPT

## 2020-01-31 NOTE — PROGRESS NOTES
PRANAV Clemons. is a 20 y.o. male who presents today for physical for school. 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 Pt has brought in physical forms to be completed requesting titer labs and TB screening. Pt is currently only taking Adderall for ADHD and is not under the care of any specialist.  Pt denies any acute medical concerns. Current Outpatient Medications   Medication Sig Dispense Refill    ADDERALL XR 30 mg XR capsule Take 1 Cap by mouth every morning. Max Daily Amount: 30 mg. 30 Cap 0    hydrocortisone (HYTONE) 2.5 % topical cream Apply  to affected area two (2) times a day. use thin layer 30 g 0      No Known Allergies    SUBJECTIVE:  Review of Systems   Constitutional: Negative for chills, fever and malaise/fatigue. HENT: Negative for congestion, ear pain, sinus pain and sore throat. Eyes: Negative for blurred vision. Respiratory: Negative for cough and shortness of breath. Cardiovascular: Negative for chest pain, palpitations and leg swelling. Gastrointestinal: Negative for abdominal pain, constipation, diarrhea, nausea and vomiting. Genitourinary: Negative for dysuria, frequency and urgency. Musculoskeletal: Negative for falls, joint pain and myalgias. Skin: Negative for itching. Neurological: Negative for dizziness, tingling, sensory change and headaches. Psychiatric/Behavioral: Negative for depression and suicidal ideas. The patient is not nervous/anxious. OBJECTIVE:  Visit Vitals  /84   Pulse (!) 104   Temp 99.9 °F (37.7 °C) (Oral)   Resp 18   Ht 5' 5\" (1.651 m)   Wt 233 lb 12.8 oz (106.1 kg)   SpO2 96%   BMI 38.91 kg/m²      I have reviewed/discussed the above normal BMI with the patient. I have recommended the following interventions: dietary management education, guidance, and counseling, encourage exercise and monitor weight . Larua Dimas Physical Exam  Vitals signs and nursing note reviewed.    Constitutional: Appearance: Normal appearance. HENT:      Head: Normocephalic. Right Ear: Tympanic membrane and ear canal normal.      Left Ear: Tympanic membrane and ear canal normal.      Nose: Nose normal. No congestion. Mouth/Throat:      Mouth: Mucous membranes are moist.      Pharynx: No posterior oropharyngeal erythema. Eyes:      Pupils: Pupils are equal, round, and reactive to light. Neck:      Musculoskeletal: Normal range of motion. No muscular tenderness. Cardiovascular:      Rate and Rhythm: Regular rhythm. Tachycardia present. Pulses: Normal pulses. Heart sounds: Normal heart sounds. No murmur. No friction rub. No gallop. Pulmonary:      Effort: Pulmonary effort is normal.      Breath sounds: Normal breath sounds. No wheezing, rhonchi or rales. Abdominal:      General: Abdomen is flat. Bowel sounds are normal. There is no distension. Palpations: Abdomen is soft. Tenderness: There is no abdominal tenderness. Hernia: No hernia is present. Musculoskeletal:         General: No swelling or tenderness. Right lower leg: No edema. Left lower leg: No edema. Skin:     General: Skin is warm and dry. Neurological:      General: No focal deficit present. Mental Status: He is alert and oriented to person, place, and time. Psychiatric:         Mood and Affect: Mood normal.         ASSESSMENT:  Diagnoses and all orders for this visit:    1. School physical exam  -     VZV AB, IGG; Future  -     HEP B SURFACE AB; Future  -     MEASLES/MUMPS/RUBELLA IMMUNITY; Future    2. Screening for tuberculosis  -     AMB POC TUBERCULOSIS, INTRADERMAL (SKIN TEST)    PLAN:  Labs today  TB skin test placed    I have discussed the diagnosis with the patient and the intended plan as seen in the above orders. The patient has received an after-visit summary and questions were answered concerning future plans.   I have discussed medication side effects and warnings with the patient as well. Patient will call for further questions. Follow-up and Dispositions    · Return in about 2 days (around 2/2/2020) for TB reading.        Aniya Hnery, NP

## 2020-01-31 NOTE — PROGRESS NOTES
Luz Marina Boyle is a  20 y.o. male presents today for office visit for CPE. 1. Have you been to the ER, urgent care clinic or hospitalized since your last visit? NO      2. Have you seen or consulted any other health care providers outside of the [address] [place] [address] since your last visit (Include any pap smears or colon screening)? NO       PPD Placement note  Luz Marina Boyle, 20 y.o. male is here today for placement of PPD test  Reason for PPD test: SChool  Pt taken PPD test before: no  Verified in allergy area and with patient that they are not allergic to the products PPD is made of (Phenol or Tween). No:     Is patient taking any oral or IV steroid medication now or have they taken it in the last month? no  Has the patient ever received the BCG vaccine?: no  Has the patient been in recent contact with anyone known or suspected of having active TB disease?: no       Date of exposure (if applicable):        Name of person they were exposed to (if applicable):   Patient's Country of origin?: [place]  O: Alert and oriented in NAD. P:  PPD placed on 1/31/2020 3:30pm.  Patient advised to return for reading within 48-72 hours.

## 2020-01-31 NOTE — PATIENT INSTRUCTIONS
Well Visit, Ages 25 to 48: Care Instructions Your Care Instructions Physical exams can help you stay healthy. Your doctor has checked your overall health and may have suggested ways to take good care of yourself. He or she also may have recommended tests. At home, you can help prevent illness with healthy eating, regular exercise, and other steps. Follow-up care is a key part of your treatment and safety. Be sure to make and go to all appointments, and call your doctor if you are having problems. It's also a good idea to know your test results and keep a list of the medicines you take. How can you care for yourself at home? · Reach and stay at a healthy weight. This will lower your risk for many problems, such as obesity, diabetes, heart disease, and high blood pressure. · Get at least 30 minutes of physical activity on most days of the week. Walking is a good choice. You also may want to do other activities, such as running, swimming, cycling, or playing tennis or team sports. Discuss any changes in your exercise program with your doctor. · Do not smoke or allow others to smoke around you. If you need help quitting, talk to your doctor about stop-smoking programs and medicines. These can increase your chances of quitting for good. · Talk to your doctor about whether you have any risk factors for sexually transmitted infections (STIs). Having one sex partner (who does not have STIs and does not have sex with anyone else) is a good way to avoid these infections. · Use birth control if you do not want to have children at this time. Talk with your doctor about the choices available and what might be best for you. · Protect your skin from too much sun. When you're outdoors from 10 a.m. to 4 p.m., stay in the shade or cover up with clothing and a hat with a wide brim. Wear sunglasses that block UV rays. Even when it's cloudy, put broad-spectrum sunscreen (SPF 30 or higher) on any exposed skin. · See a dentist one or two times a year for checkups and to have your teeth cleaned. · Wear a seat belt in the car. Follow your doctor's advice about when to have certain tests. These tests can spot problems early. For everyone · Cholesterol. Have the fat (cholesterol) in your blood tested after age 21. Your doctor will tell you how often to have this done based on your age, family history, or other things that can increase your risk for heart disease. · Blood pressure. Have your blood pressure checked during a routine doctor visit. Your doctor will tell you how often to check your blood pressure based on your age, your blood pressure results, and other factors. · Vision. Talk with your doctor about how often to have a glaucoma test. 
· Diabetes. Ask your doctor whether you should have tests for diabetes. · Colon cancer. Your risk for colorectal cancer gets higher as you get older. Some experts say that adults should start regular screening at age 48 and stop at age 76. Others say to start before age 48 or continue after age 76. Talk with your doctor about your risk and when to start and stop screening. For women · Breast exam and mammogram. Talk to your doctor about when you should have a clinical breast exam and a mammogram. Medical experts differ on whether and how often women under 50 should have these tests. Your doctor can help you decide what is right for you. · Cervical cancer screening test and pelvic exam. Begin with a Pap test at age 24. The test often is part of a pelvic exam. Starting at age 27, you may choose to have a Pap test, an HPV test, or both tests at the same time (called co-testing). Talk with your doctor about how often to have testing. · Tests for sexually transmitted infections (STIs). Ask whether you should have tests for STIs. You may be at risk if you have sex with more than one person, especially if your partners do not wear condoms. For men · Tests for sexually transmitted infections (STIs). Ask whether you should have tests for STIs. You may be at risk if you have sex with more than one person, especially if you do not wear a condom. · Testicular cancer exam. Ask your doctor whether you should check your testicles regularly. · Prostate exam. Talk to your doctor about whether you should have a blood test (called a PSA test) for prostate cancer. Experts differ on whether and when men should have this test. Some experts suggest it if you are older than 39 and are -American or have a father or brother who got prostate cancer when he was younger than 72. When should you call for help? Watch closely for changes in your health, and be sure to contact your doctor if you have any problems or symptoms that concern you. Where can you learn more? Go to http://trevor-blanca.info/. Enter P072 in the search box to learn more about \"Well Visit, Ages 25 to 48: Care Instructions. \" Current as of: December 13, 2018 Content Version: 12.2 © 6113-2002 [employer]. Care instructions adapted under license by [employer] (which disclaims liability or warranty for this information). If you have questions about a medical condition or this instruction, always ask your healthcare professional. [address] any warranty or liability for your use of this information.

## 2020-02-02 LAB
MEV IGG SER IA-ACNC: 17.8 AU/ML
MUV IGG SER IA-ACNC: 77.6 AU/ML
RUBV IGG SERPL IA-ACNC: 6.25 INDEX
VZV IGG SER IA-ACNC: 212 INDEX

## 2020-02-03 LAB
HBV SURFACE AB SER QL IA: NEGATIVE
HBV SURFACE AB SERPL IA-ACNC: <3.1 MIU/ML
HEP BS AB COMMENT,HBSAC: ABNORMAL

## 2020-02-03 NOTE — PROGRESS NOTES
Patient presented for a PPD reading. Result negative, 0 mm. Patient left paperwork to be completed for college and left office with no complaints of pain or distress at this time.

## 2020-02-06 ENCOUNTER — TELEPHONE (OUTPATIENT)
Dept: FAMILY MEDICINE CLINIC | Age: 20
End: 2020-02-06

## 2020-02-06 NOTE — TELEPHONE ENCOUNTER
Called patient no answer left name and call back number. The call was to inform patient that his paper work was ready for .

## 2020-02-17 DIAGNOSIS — F90.9 ATTENTION DEFICIT HYPERACTIVITY DISORDER (ADHD), UNSPECIFIED ADHD TYPE: ICD-10-CM

## 2020-02-17 RX ORDER — DEXTROAMPHETAMINE SULFATE, DEXTROAMPHETAMINE SACCHARATE, AMPHETAMINE SULFATE AND AMPHETAMINE ASPARTATE 7.5; 7.5; 7.5; 7.5 MG/1; MG/1; MG/1; MG/1
30 CAPSULE, EXTENDED RELEASE ORAL
Qty: 30 CAP | Refills: 0 | Status: SHIPPED | OUTPATIENT
Start: 2020-02-17 | End: 2020-03-23 | Stop reason: SDUPTHER

## 2020-02-17 NOTE — TELEPHONE ENCOUNTER
Last Visit: 1/31/20 with REJI Godoy Next Appointment: none Previous Refill Encounter(s): 1/7/20 #30 Requested Prescriptions Pending Prescriptions Disp Refills  ADDERALL XR 30 mg XR capsule 30 Cap 0 Sig: Take 1 Cap by mouth every morning. Max Daily Amount: 30 mg.

## 2020-02-24 LAB
MM INDURATION POC: 0 MM (ref 0–5)
PPD POC: NEGATIVE NEGATIVE

## 2020-03-23 DIAGNOSIS — F90.9 ATTENTION DEFICIT HYPERACTIVITY DISORDER (ADHD), UNSPECIFIED ADHD TYPE: ICD-10-CM

## 2020-03-23 RX ORDER — DEXTROAMPHETAMINE SULFATE, DEXTROAMPHETAMINE SACCHARATE, AMPHETAMINE SULFATE AND AMPHETAMINE ASPARTATE 7.5; 7.5; 7.5; 7.5 MG/1; MG/1; MG/1; MG/1
30 CAPSULE, EXTENDED RELEASE ORAL
Qty: 30 CAP | Refills: 0 | Status: SHIPPED | OUTPATIENT
Start: 2020-03-23 | End: 2020-04-30 | Stop reason: SDUPTHER

## 2020-03-23 NOTE — TELEPHONE ENCOUNTER
Patient calling in for a refill on medication but he has not scheduled apt to est care with another provider. I advised the patient that he will need to schedule apt. He will call back in a couple weeks when the office is scheduling patients after covid concerns.

## 2020-03-23 NOTE — TELEPHONE ENCOUNTER
[facility] date for Adderall XR as 2/17/20 for a 30 d/s. Last Visit: 1/31/20 with REJI Godoy Next Appointment: none Previous Refill Encounter(s): 2/17/20 #30 Requested Prescriptions Pending Prescriptions Disp Refills  Adderall XR 30 mg XR capsule 30 Cap 0 Sig: Take 1 Cap by mouth every morning. Max Daily Amount: 30 mg.

## 2020-04-22 NOTE — TELEPHONE ENCOUNTER
Patient called to request refill for adderral, adv shows was sent to pharmacy on erecieved on 1/7/20 diarrhea since yesterday feels very weak

## 2020-04-30 ENCOUNTER — VIRTUAL VISIT (OUTPATIENT)
Dept: FAMILY MEDICINE CLINIC | Age: 20
End: 2020-04-30

## 2020-04-30 DIAGNOSIS — F90.9 ATTENTION DEFICIT HYPERACTIVITY DISORDER (ADHD), UNSPECIFIED ADHD TYPE: ICD-10-CM

## 2020-04-30 RX ORDER — DEXTROAMPHETAMINE SULFATE, DEXTROAMPHETAMINE SACCHARATE, AMPHETAMINE SULFATE AND AMPHETAMINE ASPARTATE 7.5; 7.5; 7.5; 7.5 MG/1; MG/1; MG/1; MG/1
30 CAPSULE, EXTENDED RELEASE ORAL
Qty: 30 CAP | Refills: 0 | Status: SHIPPED | OUTPATIENT
Start: 2020-04-30 | End: 2020-06-09 | Stop reason: SDUPTHER

## 2020-04-30 NOTE — PROGRESS NOTES
Gwen Barnes is a 20 y.o. male who was seen by synchronous (real-time) audio-video technology on 4/30/2020. Consent: Gwen Barnes, who was seen by synchronous (real-time) audio-video technology, and/or his healthcare decision maker, is aware that this patient-initiated, Telehealth encounter on 4/30/2020 is a billable service, with coverage as determined by his insurance carrier. He is aware that he may receive a bill and has provided verbal consent to proceed: Yes. I am workng from home. Patient is at his home. Subjective:   Gwen Barnes is a 20 y.o. male who was seen for ADHD med refill. Pt states he was tested when he was 2years old. He is not aware of having any testing results available    Prior to Admission medications    Medication Sig Start Date End Date Taking? Authorizing Provider   Adderall XR 30 mg XR capsule Take 1 Cap by mouth every morning. Max Daily Amount: 30 mg. 3/23/20   Maynor Douglas MD   hydrocortisone (HYTONE) 2.5 % topical cream Apply  to affected area two (2) times a day. use thin layer 6/6/19   Kate Torre NP     No Known Allergies    Patient Active Problem List    Diagnosis Date Noted    Severe obesity ([facility] [address] 08/19/2019    Need for diphtheria-tetanus-pertussis (Tdap) vaccine 09/10/2018    Obesity (BMI 30.0-34.9) 01/18/2018    ADHD 01/18/2018     Current Outpatient Medications   Medication Sig Dispense Refill    Adderall XR 30 mg XR capsule Take 1 Cap by mouth every morning. Max Daily Amount: 30 mg. 30 Cap 0    hydrocortisone (HYTONE) 2.5 % topical cream Apply  to affected area two (2) times a day. use thin layer 30 g 0     No Known Allergies  Past Medical History:   Diagnosis Date    ADHD      No past surgical history on file.   Family History   Problem Relation Age of Onset    Cancer Maternal Grandmother     Cancer Paternal Grandmother     Stomach Cancer Paternal Grandfather     Cancer Paternal Grandfather     Lung Disease Paternal Grandfather      Social History     Tobacco Use    Smoking status: Never Smoker    Smokeless tobacco: Never Used   Substance Use Topics    Alcohol use: No       Review of Systems   Constitutional: Negative. HENT: Negative. Respiratory: Negative. Cardiovascular: Negative. Objective:   Vital Signs: (As obtained by patient/caregiver at home)  There were no vitals taken for this visit. [INSTRUCTIONS:  \"[x]\" Indicates a positive item  \"[]\" Indicates a negative item  -- DELETE ALL ITEMS NOT EXAMINED]    Constitutional: [x] Appears well-developed and well-nourished [x] No apparent distress          Mental status: [x] Alert and awake  [x] Oriented to person/place/time [x] Able to follow commands        Eyes:   EOM    [x]  Normal       Sclera  [x]  Normal              Discharge [x]  None visible       HENT: [x] Normocephalic, atraumatic      Pulmonary/Chest: [x] Respiratory effort normal   [x] No visualized signs of difficulty breathing or respiratory distress            Neurological:        [x] No Facial Asymmetry (Cranial nerve 7 motor function) (limited exam due to video visit)                Psychiatric:       [x] Normal Affect          Diagnoses and all orders for this visit:    Attention deficit hyperactivity disorder (ADHD), unspecified ADHD type  -     Adderall XR 30 mg XR capsule; Take 1 Cap by mouth every morning. Max Daily Amount: 30 mg., Normal, Disp-30 Cap, R-0, ENRIQUE      PLAN:  We discussed his history. I asked Pt to see if he could get the results of his testing. I have discussed the diagnosis with the patient and the intended plan as seen in the above orders. The patient has received an after-visit summary and questions were answered concerning future plans. I have discussed medication side effects and warnings with the patient as well. Patient will call for further questions.     Follow-up and Dispositions    · Return in about 3 months (around 7/30/2020) for ADHD follow up.             We discussed the expected course, resolution and complications of the diagnosis(es) in detail. Medication risks, benefits, costs, interactions, and alternatives were discussed as indicated. I advised him to contact the office if his condition worsens, changes or fails to improve as anticipated. He expressed understanding with the diagnosis(es) and plan. Mayra Lynch is a 20 y.o. male who was evaluated by a video visit encounter for concerns as above. Patient identification was verified prior to start of the visit. A caregiver was present when appropriate. Due to this being a TeleHealth encounter (During YFUSI-87 public health emergency), evaluation of the following organ systems was limited: Vitals/Constitutional/EENT/Resp/CV/GI//MS/Neuro/Skin/Heme-Lymph-Imm. Pursuant to the emergency declaration under the [facility], [facility]5 waiver authority and the [email or web address] and [employer] Act, this Virtual  Visit was conducted, with patient's (and/or legal guardian's) consent, to reduce the patient's risk of exposure to COVID-19 and provide necessary medical care. Services were provided through a video synchronous discussion virtually to substitute for in-person clinic visit. Patient and provider were located at their individual homes.       Sharlene Ramirez NP

## 2020-06-09 DIAGNOSIS — F90.9 ATTENTION DEFICIT HYPERACTIVITY DISORDER (ADHD), UNSPECIFIED ADHD TYPE: ICD-10-CM

## 2020-06-09 RX ORDER — DEXTROAMPHETAMINE SULFATE, DEXTROAMPHETAMINE SACCHARATE, AMPHETAMINE SULFATE AND AMPHETAMINE ASPARTATE 7.5; 7.5; 7.5; 7.5 MG/1; MG/1; MG/1; MG/1
30 CAPSULE, EXTENDED RELEASE ORAL
Qty: 30 CAP | Refills: 0 | Status: SHIPPED | OUTPATIENT
Start: 2020-06-09 | End: 2020-07-16 | Stop reason: SDUPTHER

## 2020-06-09 NOTE — TELEPHONE ENCOUNTER
[facility] reports the last fill date for Adderall XR as 4/30/20 for a 30 d/s. Last Visit: 4/30/20 with REJI Castro Next Appointment: 6/12/20 with REJI Castro Previous Refill Encounter(s): 4/30/20 #30 Requested Prescriptions Pending Prescriptions Disp Refills  Adderall XR 30 mg XR capsule 30 Cap 0 Sig: Take 1 Cap by mouth every morning. Max Daily Amount: 30 mg.

## 2020-06-09 NOTE — TELEPHONE ENCOUNTER
Requested Prescriptions Pending Prescriptions Disp Refills  Adderall XR 30 mg XR capsule 30 Cap 0 Sig: Take 1 Cap by mouth every morning. Max Daily Amount: 30 mg. Next ov 6/10/20

## 2020-06-12 ENCOUNTER — OFFICE VISIT (OUTPATIENT)
Dept: FAMILY MEDICINE CLINIC | Age: 20
End: 2020-06-12

## 2020-06-12 VITALS
SYSTOLIC BLOOD PRESSURE: 128 MMHG | HEIGHT: 65 IN | TEMPERATURE: 98 F | RESPIRATION RATE: 18 BRPM | OXYGEN SATURATION: 98 % | BODY MASS INDEX: 40.32 KG/M2 | DIASTOLIC BLOOD PRESSURE: 84 MMHG | WEIGHT: 242 LBS | HEART RATE: 110 BPM

## 2020-06-12 DIAGNOSIS — H65.02 NON-RECURRENT ACUTE SEROUS OTITIS MEDIA OF LEFT EAR: Primary | ICD-10-CM

## 2020-06-12 RX ORDER — CEFUROXIME AXETIL 500 MG/1
500 TABLET ORAL 2 TIMES DAILY
Qty: 20 TAB | Refills: 0 | Status: SHIPPED | OUTPATIENT
Start: 2020-06-12 | End: 2020-12-10 | Stop reason: ALTCHOICE

## 2020-06-12 NOTE — PROGRESS NOTES
Anthony Moore. is a  20 y.o. male presents today for office visit for ear pain. 1. Have you been to the ER, urgent care clinic or hospitalized since your last visit? NO     2. Have you seen or consulted any other health care providers outside of the [address] [place] [address] since your last visit (Include any pap smears or colon screening)? NO

## 2020-06-12 NOTE — PROGRESS NOTES
HISTORY OF PRESENT ILLNESS  Dhara Rich is a 20 y.o. male. Patient presents today with left ear pain. HPI  This started over a week a go. It is only his left ear. It hurts to touch it. He denies fever. He has not taken anything for it. He has no post nasal drainage. Review of Systems   Constitutional: Negative. HENT: Positive for congestion, ear pain (left), sinus pain and sore throat. Respiratory: Negative. Cardiovascular: Negative. Visit Vitals  /84   Pulse (!) 110   Temp 98 °F (36.7 °C) (Skin)   Resp 18   Ht 5' 5\" (1.651 m)   Wt 242 lb (109.8 kg)   SpO2 98%   BMI 40.27 kg/m²       Physical Exam  Constitutional:       General: He is not in acute distress. Appearance: Normal appearance. He is not ill-appearing. HENT:      Right Ear: A middle ear effusion is present. Left Ear: Tympanic membrane is erythematous. Nose: Nose normal.      Mouth/Throat:      Mouth: Mucous membranes are moist.   Eyes:      Extraocular Movements: Extraocular movements intact. Pupils: Pupils are equal, round, and reactive to light. Neck:      Musculoskeletal: Normal range of motion. Cardiovascular:      Rate and Rhythm: Normal rate and regular rhythm. Heart sounds: No murmur. Pulmonary:      Effort: Pulmonary effort is normal. No respiratory distress. Breath sounds: Normal breath sounds. No stridor. No wheezing, rhonchi or rales. Neurological:      Mental Status: He is alert. ASSESSMENT and PLAN    ICD-10-CM ICD-9-CM    1. Non-recurrent acute serous otitis media of left ear H65.02 381.01 cefUROXime (CEFTIN) 500 mg tablet     PLAN:    I have discussed the diagnosis with the patient and the intended plan as seen in the above orders. The patient has received an after-visit summary and questions were answered concerning future plans. I have discussed medication side effects and warnings with the patient as well.  Patient will call for further questions. Follow-up and Dispositions    · Return if symptoms worsen or fail to improve.

## 2020-07-16 DIAGNOSIS — F90.9 ATTENTION DEFICIT HYPERACTIVITY DISORDER (ADHD), UNSPECIFIED ADHD TYPE: ICD-10-CM

## 2020-07-16 RX ORDER — DEXTROAMPHETAMINE SULFATE, DEXTROAMPHETAMINE SACCHARATE, AMPHETAMINE SULFATE AND AMPHETAMINE ASPARTATE 7.5; 7.5; 7.5; 7.5 MG/1; MG/1; MG/1; MG/1
30 CAPSULE, EXTENDED RELEASE ORAL
Qty: 30 CAP | Refills: 0 | Status: SHIPPED | OUTPATIENT
Start: 2020-07-16 | End: 2020-08-24 | Stop reason: SDUPTHER

## 2020-07-16 NOTE — TELEPHONE ENCOUNTER
[facility] for Adderall XR as 6/9/20 for a 30 d/s. Last Visit: 6/12/20 with REJI Castro  Next Appointment: none  Previous Refill Encounter(s): 6/9/20 #30    Requested Prescriptions     Pending Prescriptions Disp Refills    Adderall XR 30 mg XR capsule 30 Cap 0     Sig: Take 1 Cap by mouth every morning. Max Daily Amount: 30 mg.

## 2020-08-24 DIAGNOSIS — F90.9 ATTENTION DEFICIT HYPERACTIVITY DISORDER (ADHD), UNSPECIFIED ADHD TYPE: ICD-10-CM

## 2020-08-24 RX ORDER — DEXTROAMPHETAMINE SULFATE, DEXTROAMPHETAMINE SACCHARATE, AMPHETAMINE SULFATE AND AMPHETAMINE ASPARTATE 7.5; 7.5; 7.5; 7.5 MG/1; MG/1; MG/1; MG/1
30 CAPSULE, EXTENDED RELEASE ORAL
Qty: 30 CAP | Refills: 0 | Status: SHIPPED | OUTPATIENT
Start: 2020-08-24 | End: 2020-09-25 | Stop reason: SDUPTHER

## 2020-08-24 NOTE — TELEPHONE ENCOUNTER
[facility] for Adderall XR as 7/16/20 for a 30 d/s. Last Visit: 6/12/20 with REJI Castro Next Appointment: none Previous Refill Encounter(s): 7/16/20 #30 Requested Prescriptions Pending Prescriptions Disp Refills  Adderall XR 30 mg XR capsule 30 Cap 0 Sig: Take 1 Cap by mouth every morning. Max Daily Amount: 30 mg.

## 2020-08-24 NOTE — TELEPHONE ENCOUNTER
Adderall XR 30 mg XR capsule     07/16/20   --  Arthurine Crease, NP Take 1 Cap by mouth every morning. Max Daily Amount: 30 mg.

## 2020-09-25 DIAGNOSIS — F90.9 ATTENTION DEFICIT HYPERACTIVITY DISORDER (ADHD), UNSPECIFIED ADHD TYPE: ICD-10-CM

## 2020-09-25 RX ORDER — DEXTROAMPHETAMINE SULFATE, DEXTROAMPHETAMINE SACCHARATE, AMPHETAMINE SULFATE AND AMPHETAMINE ASPARTATE 7.5; 7.5; 7.5; 7.5 MG/1; MG/1; MG/1; MG/1
30 CAPSULE, EXTENDED RELEASE ORAL
Qty: 30 CAP | Refills: 0 | Status: SHIPPED | OUTPATIENT
Start: 2020-09-25 | End: 2020-10-29 | Stop reason: SDUPTHER

## 2020-09-25 NOTE — TELEPHONE ENCOUNTER
[facility]der[facility] XR as 8/24/20 for a 30 d/s. Last Visit: 6/12/20 with REJI Castro Next Appointment: none Previous Refill Encounter(s): 8/24/20 #30 Requested Prescriptions Pending Prescriptions Disp Refills  Adderall XR 30 mg XR capsule 30 Cap 0 Sig: Take 1 Cap by mouth every morning. Max Daily Amount: 30 mg.

## 2020-10-29 DIAGNOSIS — F90.9 ATTENTION DEFICIT HYPERACTIVITY DISORDER (ADHD), UNSPECIFIED ADHD TYPE: ICD-10-CM

## 2020-10-29 NOTE — TELEPHONE ENCOUNTER
[facility]derall XR as 9/25/20 for a 30 d/s. Last Visit: 6/12/20 with REJI Castro Next Appointment: none Previous Refill Encounter(s): 9/25/20 #30 Requested Prescriptions Pending Prescriptions Disp Refills  Adderall XR 30 mg XR capsule 30 Cap 0 Sig: Take 1 Cap by mouth every morning. Max Daily Amount: 30 mg.

## 2020-10-30 RX ORDER — DEXTROAMPHETAMINE SULFATE, DEXTROAMPHETAMINE SACCHARATE, AMPHETAMINE SULFATE AND AMPHETAMINE ASPARTATE 7.5; 7.5; 7.5; 7.5 MG/1; MG/1; MG/1; MG/1
30 CAPSULE, EXTENDED RELEASE ORAL
Qty: 30 CAP | Refills: 0 | Status: SHIPPED | OUTPATIENT
Start: 2020-10-30 | End: 2020-12-10 | Stop reason: SDUPTHER

## 2020-12-01 DIAGNOSIS — F90.9 ATTENTION DEFICIT HYPERACTIVITY DISORDER (ADHD), UNSPECIFIED ADHD TYPE: ICD-10-CM

## 2020-12-01 NOTE — TELEPHONE ENCOUNTER
[facility] Adderall XR as 10/30/20 for a 30 d/s. Last Visit: 6/12/20 with REJI Castro Next Appointment: none Previous Refill Encounter(s): 10/30/20 #30 Requested Prescriptions Pending Prescriptions Disp Refills  Adderall XR 30 mg XR capsule 30 Cap 0 Sig: Take 1 Cap by mouth every morning. Max Daily Amount: 30 mg.

## 2020-12-05 RX ORDER — DEXTROAMPHETAMINE SULFATE, DEXTROAMPHETAMINE SACCHARATE, AMPHETAMINE SULFATE AND AMPHETAMINE ASPARTATE 7.5; 7.5; 7.5; 7.5 MG/1; MG/1; MG/1; MG/1
30 CAPSULE, EXTENDED RELEASE ORAL
Qty: 30 CAP | Refills: 0 | OUTPATIENT
Start: 2020-12-05

## 2021-01-07 DIAGNOSIS — F90.9 ATTENTION DEFICIT HYPERACTIVITY DISORDER (ADHD), UNSPECIFIED ADHD TYPE: ICD-10-CM

## 2021-01-07 RX ORDER — DEXTROAMPHETAMINE SULFATE, DEXTROAMPHETAMINE SACCHARATE, AMPHETAMINE SULFATE AND AMPHETAMINE ASPARTATE 7.5; 7.5; 7.5; 7.5 MG/1; MG/1; MG/1; MG/1
30 CAPSULE, EXTENDED RELEASE ORAL
Qty: 30 CAP | Refills: 0 | Status: SHIPPED | OUTPATIENT
Start: 2021-01-07 | End: 2021-03-05 | Stop reason: SDUPTHER

## 2021-01-07 NOTE — TELEPHONE ENCOUNTER
[facility]derall XR as 12/10/20 for a 30 d/s. Last Visit: 12/10/20 with MD Malia Ruiz Next Appointment: Advised to follow-up in 6 months Previous Refill Encounter(s): 12/10/20 #30 Requested Prescriptions Pending Prescriptions Disp Refills  Adderall XR 30 mg XR capsule 30 Cap 0 Sig: Take 1 Cap by mouth every morning. Max Daily Amount: 30 mg.

## 2021-03-05 DIAGNOSIS — F90.9 ATTENTION DEFICIT HYPERACTIVITY DISORDER (ADHD), UNSPECIFIED ADHD TYPE: ICD-10-CM

## 2021-03-05 NOTE — TELEPHONE ENCOUNTER
[facility] Adderall XR as 1/8/21 for a 30 d/s. Last Visit: 12/10/20 with MD Evie Flores Next Appointment: Advised to follow-up in 6 months Previous Refill Encounter(s): 1/7/21 #30 Requested Prescriptions Pending Prescriptions Disp Refills  Adderall XR 30 mg XR capsule 30 Cap 0 Sig: Take 1 Cap by mouth every morning. Max Daily Amount: 30 mg.

## 2021-03-10 RX ORDER — DEXTROAMPHETAMINE SULFATE, DEXTROAMPHETAMINE SACCHARATE, AMPHETAMINE SULFATE AND AMPHETAMINE ASPARTATE 7.5; 7.5; 7.5; 7.5 MG/1; MG/1; MG/1; MG/1
30 CAPSULE, EXTENDED RELEASE ORAL
Qty: 30 CAP | Refills: 0 | Status: SHIPPED | OUTPATIENT
Start: 2021-03-10 | End: 2021-04-14 | Stop reason: SDUPTHER

## 2021-04-14 DIAGNOSIS — F90.9 ATTENTION DEFICIT HYPERACTIVITY DISORDER (ADHD), UNSPECIFIED ADHD TYPE: ICD-10-CM

## 2021-04-14 NOTE — TELEPHONE ENCOUNTER
[facility] Adderall XR as 3/11/21 for a 30 d/s. Last Visit: 12/10/20 with MD Noris Vargas Next Appointment: Advised to follow-up in 6 months Previous Refill Encounter(s): 3/10/21 #30 Requested Prescriptions Pending Prescriptions Disp Refills  Adderall XR 30 mg XR capsule 30 Cap 0 Sig: Take 1 Cap by mouth every morning. Max Daily Amount: 30 mg.

## 2021-04-23 RX ORDER — DEXTROAMPHETAMINE SULFATE, DEXTROAMPHETAMINE SACCHARATE, AMPHETAMINE SULFATE AND AMPHETAMINE ASPARTATE 7.5; 7.5; 7.5; 7.5 MG/1; MG/1; MG/1; MG/1
30 CAPSULE, EXTENDED RELEASE ORAL
Qty: 30 CAP | Refills: 0 | Status: SHIPPED | OUTPATIENT
Start: 2021-04-23 | End: 2021-07-22 | Stop reason: SDUPTHER

## 2021-07-22 DIAGNOSIS — F90.9 ATTENTION DEFICIT HYPERACTIVITY DISORDER (ADHD), UNSPECIFIED ADHD TYPE: ICD-10-CM

## 2021-07-22 NOTE — TELEPHONE ENCOUNTER
[facility]derall XR as 4/23/21 for a 30 d/s. Last Visit: 12/10/20 with MD Aimee Cutler  Next Appointment: Neil Rene to follow-up in 6 months  Previous Refill Encounter(s): 4/23/21 #30    Requested Prescriptions     Pending Prescriptions Disp Refills    Adderall XR 30 mg XR capsule 30 Capsule 0     Sig: Take 1 Capsule by mouth every morning. Max Daily Amount: 30 mg.

## 2021-07-30 RX ORDER — DEXTROAMPHETAMINE SULFATE, DEXTROAMPHETAMINE SACCHARATE, AMPHETAMINE SULFATE AND AMPHETAMINE ASPARTATE 7.5; 7.5; 7.5; 7.5 MG/1; MG/1; MG/1; MG/1
30 CAPSULE, EXTENDED RELEASE ORAL
Qty: 30 CAPSULE | Refills: 0 | Status: SHIPPED | OUTPATIENT
Start: 2021-07-30 | End: 2021-09-24 | Stop reason: SDUPTHER

## 2021-09-24 DIAGNOSIS — F90.9 ATTENTION DEFICIT HYPERACTIVITY DISORDER (ADHD), UNSPECIFIED ADHD TYPE: ICD-10-CM

## 2021-09-24 NOTE — TELEPHONE ENCOUNTER
[facility] Adderall XR as 7/30/21 for a 30 d/s. Last Visit: 12/10/20 with MD Ivy Ag  Next Appointment: Advised to follow-up in 6 months  Previous Refill Encounter(s): 7/30/21 #30    Requested Prescriptions     Pending Prescriptions Disp Refills    Adderall XR 30 mg XR capsule 30 Capsule 0     Sig: Take 1 Capsule by mouth every morning. Max Daily Amount: 30 mg.

## 2021-09-27 RX ORDER — DEXTROAMPHETAMINE SULFATE, DEXTROAMPHETAMINE SACCHARATE, AMPHETAMINE SULFATE AND AMPHETAMINE ASPARTATE 7.5; 7.5; 7.5; 7.5 MG/1; MG/1; MG/1; MG/1
30 CAPSULE, EXTENDED RELEASE ORAL
Qty: 30 CAPSULE | Refills: 0 | Status: SHIPPED | OUTPATIENT
Start: 2021-09-27 | End: 2021-11-30 | Stop reason: SDUPTHER

## 2021-10-21 ENCOUNTER — TELEPHONE (OUTPATIENT)
Dept: FAMILY MEDICINE CLINIC | Age: 21
End: 2021-10-21

## 2021-11-30 DIAGNOSIS — F90.9 ATTENTION DEFICIT HYPERACTIVITY DISORDER (ADHD), UNSPECIFIED ADHD TYPE: ICD-10-CM

## 2021-11-30 NOTE — TELEPHONE ENCOUNTER
[facility]derall XR as 9/27/21 for a 30 d/s. Last Visit: 12/10/21 with MD Idris Gallo  Next Appointment: 1/6/22 with MD Idris Gallo  Previous Refill Encounter(s): 9/27/21 #30    Requested Prescriptions     Pending Prescriptions Disp Refills    Adderall XR 30 mg XR capsule 30 Capsule 0     Sig: Take 1 Capsule by mouth every morning. Max Daily Amount: 30 mg.

## 2021-12-03 RX ORDER — DEXTROAMPHETAMINE SULFATE, DEXTROAMPHETAMINE SACCHARATE, AMPHETAMINE SULFATE AND AMPHETAMINE ASPARTATE 7.5; 7.5; 7.5; 7.5 MG/1; MG/1; MG/1; MG/1
30 CAPSULE, EXTENDED RELEASE ORAL
Qty: 30 CAPSULE | Refills: 0 | Status: SHIPPED | OUTPATIENT
Start: 2021-12-03 | End: 2022-01-12 | Stop reason: SDUPTHER

## 2022-01-12 DIAGNOSIS — F90.9 ATTENTION DEFICIT HYPERACTIVITY DISORDER (ADHD), UNSPECIFIED ADHD TYPE: ICD-10-CM

## 2022-01-12 NOTE — TELEPHONE ENCOUNTER
[facility]derall XR as 12/3/21 for a 30 d/s. Last Visit: 12/10/20 with MD Landon Diez  Next Appointment: 1/6/22 pt cancelled appt  Previous Refill Encounter(s): 12/3/21 #30    Requested Prescriptions     Pending Prescriptions Disp Refills    Adderall XR 30 mg XR capsule 30 Capsule 0     Sig: Take 1 Capsule by mouth every morning. Max Daily Amount: 30 mg.

## 2022-01-15 RX ORDER — DEXTROAMPHETAMINE SULFATE, DEXTROAMPHETAMINE SACCHARATE, AMPHETAMINE SULFATE AND AMPHETAMINE ASPARTATE 7.5; 7.5; 7.5; 7.5 MG/1; MG/1; MG/1; MG/1
30 CAPSULE, EXTENDED RELEASE ORAL
Qty: 30 CAPSULE | Refills: 0 | Status: SHIPPED | OUTPATIENT
Start: 2022-01-15 | End: 2022-03-24 | Stop reason: SDUPTHER

## 2022-03-07 DIAGNOSIS — F90.9 ATTENTION DEFICIT HYPERACTIVITY DISORDER (ADHD), UNSPECIFIED ADHD TYPE: ICD-10-CM

## 2022-03-07 NOTE — TELEPHONE ENCOUNTER
Last Visit: 12/10/20 with MD Landon Diez  Next Appointment: 1/6/22 pt cancelled appt  Previous Refill Encounter(s): 1/15/22 #30    Requested Prescriptions     Pending Prescriptions Disp Refills    Adderall XR 30 mg XR capsule 30 Capsule 0     Sig: Take 1 Capsule by mouth every morning. Max Daily Amount: 30 mg.

## 2022-03-14 RX ORDER — DEXTROAMPHETAMINE SULFATE, DEXTROAMPHETAMINE SACCHARATE, AMPHETAMINE SULFATE AND AMPHETAMINE ASPARTATE 7.5; 7.5; 7.5; 7.5 MG/1; MG/1; MG/1; MG/1
30 CAPSULE, EXTENDED RELEASE ORAL
Qty: 30 CAPSULE | Refills: 0 | OUTPATIENT
Start: 2022-03-14

## 2022-03-18 PROBLEM — Z23 NEED FOR DIPHTHERIA-TETANUS-PERTUSSIS (TDAP) VACCINE: Status: ACTIVE | Noted: 2018-09-10

## 2022-03-19 PROBLEM — E66.01 SEVERE OBESITY (HCC): Status: ACTIVE | Noted: 2019-08-19

## 2022-03-19 PROBLEM — E66.9 OBESITY (BMI 30.0-34.9): Status: ACTIVE | Noted: 2018-01-18

## 2022-03-19 PROBLEM — E66.811 OBESITY (BMI 30.0-34.9): Status: ACTIVE | Noted: 2018-01-18

## 2022-03-19 PROBLEM — F90.9 ADHD: Status: ACTIVE | Noted: 2018-01-18

## 2022-03-23 NOTE — TELEPHONE ENCOUNTER
Patient left a message looking for this refill. I attempted to contact the patient back but had LMOM.

## 2022-03-24 DIAGNOSIS — F90.9 ATTENTION DEFICIT HYPERACTIVITY DISORDER (ADHD), UNSPECIFIED ADHD TYPE: ICD-10-CM

## 2022-03-24 NOTE — TELEPHONE ENCOUNTER
Contacted pt to reschedule appt, pt missed needed in office visit on 1/6/22, adv unable to be done vv, pt upset stating that we cannot retest him for his adhd, adv pt that updated labs needed are not testing for any medical condition, however labs are drawn to monitor levels while taking any medication. Pt aware and understands. States he wanted to note that he is currently out of med without meds.  Please adv [phone]

## 2022-03-25 NOTE — TELEPHONE ENCOUNTER
Patient is out of medication    Last Visit: 12/10/20 with MD Darcy Kocher  Next Appointment: 4/20/22 with MD Darcy Kocher  Previous Refill Encounter(s): 1/15/22 #30    Requested Prescriptions     Pending Prescriptions Disp Refills    Adderall XR 30 mg XR capsule 30 Capsule 0     Sig: Take 1 Capsule by mouth every morning. Max Daily Amount: 30 mg.

## 2022-03-31 RX ORDER — DEXTROAMPHETAMINE SULFATE, DEXTROAMPHETAMINE SACCHARATE, AMPHETAMINE SULFATE AND AMPHETAMINE ASPARTATE 7.5; 7.5; 7.5; 7.5 MG/1; MG/1; MG/1; MG/1
30 CAPSULE, EXTENDED RELEASE ORAL
Qty: 30 CAPSULE | Refills: 0 | Status: SHIPPED | OUTPATIENT
Start: 2022-03-31 | End: 2022-07-26 | Stop reason: SDUPTHER

## 2022-04-20 ENCOUNTER — TELEPHONE (OUTPATIENT)
Dept: FAMILY MEDICINE CLINIC | Age: 22
End: 2022-04-20

## 2022-04-20 NOTE — TELEPHONE ENCOUNTER
PT missed required appt for med refill.  Must schedule in office visit before further refills issued, no answer on attempt to contact

## 2022-07-26 ENCOUNTER — OFFICE VISIT (OUTPATIENT)
Dept: FAMILY MEDICINE CLINIC | Age: 22
End: 2022-07-26
Payer: COMMERCIAL

## 2022-07-26 VITALS
BODY MASS INDEX: 42.28 KG/M2 | SYSTOLIC BLOOD PRESSURE: 131 MMHG | RESPIRATION RATE: 16 BRPM | HEIGHT: 65 IN | HEART RATE: 74 BPM | WEIGHT: 253.8 LBS | OXYGEN SATURATION: 98 % | TEMPERATURE: 97.7 F | DIASTOLIC BLOOD PRESSURE: 82 MMHG

## 2022-07-26 DIAGNOSIS — F90.9 ATTENTION DEFICIT HYPERACTIVITY DISORDER (ADHD), UNSPECIFIED ADHD TYPE: Primary | ICD-10-CM

## 2022-07-26 DIAGNOSIS — E66.01 OBESITY, CLASS III, BMI 40-49.9 (MORBID OBESITY) (HCC): ICD-10-CM

## 2022-07-26 PROCEDURE — 99213 OFFICE O/P EST LOW 20 MIN: CPT | Performed by: FAMILY MEDICINE

## 2022-07-26 RX ORDER — DEXTROAMPHETAMINE SULFATE, DEXTROAMPHETAMINE SACCHARATE, AMPHETAMINE SULFATE AND AMPHETAMINE ASPARTATE 7.5; 7.5; 7.5; 7.5 MG/1; MG/1; MG/1; MG/1
30 CAPSULE, EXTENDED RELEASE ORAL
Qty: 30 CAPSULE | Refills: 0 | Status: SHIPPED | OUTPATIENT
Start: 2022-07-26 | End: 2022-09-12 | Stop reason: SDUPTHER

## 2022-07-26 NOTE — PROGRESS NOTES
HPI:  Tila Moreno is a 24 y.o. male who presents today with   Chief Complaint   Patient presents with    Behavioral Problem        Pt has been well;   Pt has started a new career; he has started running [employer]  He has ADHD  He has been on adderall which has helped his ADHD in past.    Has tried to wean his meds but states work productivity decreases without taking med. Med also helps with his appetite. Pt also has gained weight. Pt states that with Pt  transport yesterday he strained his low back ;  he did not bend at the knee. He just bent at the hip. He has taken Tylenol and sx are some better. Wt Readings from Last 3 Encounters:   07/26/22 253 lb 12.8 oz (115.1 kg)   06/12/20 242 lb (109.8 kg)   01/31/20 233 lb 12.8 oz (106.1 kg)               3 most recent PHQ Screens 7/26/2022   PHQ Not Done Patient refuses   Little interest or pleasure in doing things Not at all   Feeling down, depressed, irritable, or hopeless Not at all   Total Score PHQ 2 0               PMH,  Meds, Allergies, Family History, Social history reviewed      Current Outpatient Medications   Medication Sig Dispense Refill    Adderall XR 30 mg XR capsule Take 1 Capsule by mouth every morning.  Max Daily Amount: 30 mg. 30 Capsule 0        No Known Allergies               ROS as per HPI        Visit Vitals  /82 (BP 1 Location: Left upper arm, BP Patient Position: Sitting, BP Cuff Size: Large adult)   Pulse 74   Temp 97.7 °F (36.5 °C) (Temporal)   Resp 16   Ht 5' 5\" (1.651 m)   Wt 253 lb 12.8 oz (115.1 kg)   SpO2 98%   BMI 42.23 kg/m²     Physical Exam    General appearance: alert, cooperative, no distress, appears stated age  Neck: supple, symmetrical, trachea midline, no adenopathy, thyroid: not enlarged, symmetric, no tenderness/mass/nodules, no carotid bruit and no JVD  Lungs: clear to auscultation bilaterally  Heart: regular rate and rhythm, S1, S2 normal, no murmur, click, rub or gallop    Extremities: extremities normal, atraumatic, no cyanosis or edema    Assessment/Plan:    Diagnoses and all orders for this visit:    1. Attention deficit hyperactivity disorder (ADHD), unspecified ADHD type  -     Adderall XR 30 mg XR capsule; Take 1 Capsule by mouth every morning. Max Daily Amount: 30 mg.    2. 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 Follow-up and Dispositions    Return in about 4 months (around 11/26/2022) for well exam.       This has been fully explained to the patient, who indicates understanding. An After Visit Summary was printed and given to the patient.       Follow-up and Dispositions    Return in about 4 months (around 11/26/2022) for well exam.            Godfrey Green MD

## 2022-08-04 ENCOUNTER — DOCUMENTATION ONLY (OUTPATIENT)
Dept: FAMILY MEDICINE CLINIC | Age: 22
End: 2022-08-04

## 2022-08-04 NOTE — PROGRESS NOTES
Prior Authorization for Adderall XR 30 mg ER capsules completed and approved by patient's insurance from 08- through 08-.

## 2022-09-12 DIAGNOSIS — F90.9 ATTENTION DEFICIT HYPERACTIVITY DISORDER (ADHD), UNSPECIFIED ADHD TYPE: ICD-10-CM

## 2022-09-12 NOTE — TELEPHONE ENCOUNTER
Last Visit: 7/26/22 with MD Edin Oropeza  Next Appointment: 11/28/22 with MD dEin Oropeza  Previous Refill Encounter(s): 7/26/22 #30    Requested Prescriptions     Pending Prescriptions Disp Refills    Adderall XR 30 mg XR capsule 30 Capsule 0     Sig: Take 1 Capsule by mouth every morning. Max Daily Amount: 30 mg. For [facility] in place:   Recommendation Provided To:    Intervention Detail: New Rx: 1, reason: Patient Preference  Gap Closed?:   Intervention Accepted By:   Time Spent (min): 5

## 2022-09-16 RX ORDER — DEXTROAMPHETAMINE SULFATE, DEXTROAMPHETAMINE SACCHARATE, AMPHETAMINE SULFATE AND AMPHETAMINE ASPARTATE 7.5; 7.5; 7.5; 7.5 MG/1; MG/1; MG/1; MG/1
30 CAPSULE, EXTENDED RELEASE ORAL
Qty: 30 CAPSULE | Refills: 0 | Status: SHIPPED | OUTPATIENT
Start: 2022-09-16 | End: 2022-10-20 | Stop reason: SDUPTHER

## 2022-10-20 DIAGNOSIS — F90.9 ATTENTION DEFICIT HYPERACTIVITY DISORDER (ADHD), UNSPECIFIED ADHD TYPE: ICD-10-CM

## 2022-10-20 NOTE — TELEPHONE ENCOUNTER
Last Visit: 7/26/22 with MD Trisha Grande  Next Appointment: 11/28/22 with MD Trisha Grande  Previous Refill Encounter(s): 9/16/22 #30    Requested Prescriptions     Pending Prescriptions Disp Refills    Adderall XR 30 mg XR capsule 30 Capsule 0     Sig: Take 1 Capsule by mouth every morning. Max Daily Amount: 30 mg. For [facility] in place:   Recommendation Provided To:    Intervention Detail: New Rx: 1, reason: Patient PreferenceGap Closed?:   Intervention Accepted By:   Time Spent (min): 5

## 2022-10-27 RX ORDER — DEXTROAMPHETAMINE SULFATE, DEXTROAMPHETAMINE SACCHARATE, AMPHETAMINE SULFATE AND AMPHETAMINE ASPARTATE 7.5; 7.5; 7.5; 7.5 MG/1; MG/1; MG/1; MG/1
30 CAPSULE, EXTENDED RELEASE ORAL
Qty: 30 CAPSULE | Refills: 0 | Status: SHIPPED | OUTPATIENT
Start: 2022-10-27

## 2023-01-24 ENCOUNTER — OFFICE VISIT (OUTPATIENT)
Dept: FAMILY MEDICINE CLINIC | Age: 23
End: 2023-01-24
Payer: COMMERCIAL

## 2023-01-24 VITALS
OXYGEN SATURATION: 96 % | HEIGHT: 65 IN | WEIGHT: 260.2 LBS | DIASTOLIC BLOOD PRESSURE: 81 MMHG | SYSTOLIC BLOOD PRESSURE: 124 MMHG | BODY MASS INDEX: 43.35 KG/M2 | HEART RATE: 86 BPM | RESPIRATION RATE: 16 BRPM | TEMPERATURE: 97 F

## 2023-01-24 DIAGNOSIS — F90.9 ATTENTION DEFICIT HYPERACTIVITY DISORDER (ADHD), UNSPECIFIED ADHD TYPE: ICD-10-CM

## 2023-01-24 PROCEDURE — 99213 OFFICE O/P EST LOW 20 MIN: CPT | Performed by: FAMILY MEDICINE

## 2023-01-24 RX ORDER — DEXTROAMPHETAMINE SACCHARATE, AMPHETAMINE ASPARTATE MONOHYDRATE, DEXTROAMPHETAMINE SULFATE AND AMPHETAMINE SULFATE 7.5; 7.5; 7.5; 7.5 MG/1; MG/1; MG/1; MG/1
30 CAPSULE, EXTENDED RELEASE ORAL
Qty: 30 CAPSULE | Refills: 0 | Status: CANCELLED | OUTPATIENT
Start: 2023-01-24

## 2023-01-24 RX ORDER — DEXTROAMPHETAMINE SACCHARATE, AMPHETAMINE ASPARTATE MONOHYDRATE, DEXTROAMPHETAMINE SULFATE AND AMPHETAMINE SULFATE 7.5; 7.5; 7.5; 7.5 MG/1; MG/1; MG/1; MG/1
30 CAPSULE, EXTENDED RELEASE ORAL
Qty: 30 CAPSULE | Refills: 0 | Status: SHIPPED | OUTPATIENT
Start: 2023-01-24

## 2023-01-24 NOTE — TELEPHONE ENCOUNTER
Last Visit: 7/26/22   Next Appointment: 1/24/23   Requested Prescriptions     Pending Prescriptions Disp Refills    amphetamine-dextroamphetamine XR (Adderall XR) 30 mg XR capsule 30 Capsule 0     Sig: Take 1 Capsule by mouth every morning. Max Daily Amount: 30 mg.

## 2023-01-24 NOTE — TELEPHONE ENCOUNTER
----- Message from Mynor Lazaro sent at 1/19/2023 10:45 AM EST -----  Subject: Refill Request    QUESTIONS  Name of Medication? amphetamine-dextroamphetamine XR (Adderall XR) 30 mg   XR capsule  Patient-reported dosage and instructions? 30mg 1 daily  How many days do you have left? 0  Preferred Pharmacy? [facility] [address]  phone number (if available)? [phone]  ---------------------------------------------------------------------------  --------------  CALL BACK INFO  What is the best way for the office to contact you? OK to leave message on   voicemail  Preferred Call Back Phone Number? [phone]  ---------------------------------------------------------------------------  --------------  SCRIPT ANSWERS  Relationship to Patient?  Self

## 2023-01-24 NOTE — PROGRESS NOTES
1. \"Have you been to the ER, urgent care clinic since your last visit? Hospitalized since your last visit? \" No    2. \"Have you seen or consulted any other health care providers outside of the [address] [place] [address] since your last visit? \" No     3. For patients aged 39-70: Has the patient had a colonoscopy / FIT/ Cologuard?  NA - based on age

## 2023-01-27 NOTE — PROGRESS NOTES
HPI:  Maria Fernanda Baer is a 20 y.o. male who presents today with   Chief Complaint   Patient presents with    Behavioral Problem    Medication Refill      Patient is here to follow-up on ADHD. Patient is on Adderall  He tolerates medication well  The medication does help for his attention  He is in need of a refill  He has no other complaints today            3 most recent PHQ Screens 1/24/2023   PHQ Not Done -   Little interest or pleasure in doing things Not at all   Feeling down, depressed, irritable, or hopeless Not at all   Total Score PHQ 2 0               PMH,  Meds, Allergies, Family History, Social history reviewed      Current Outpatient Medications   Medication Sig Dispense Refill    amphetamine-dextroamphetamine XR (Adderall XR) 30 mg XR capsule Take 1 Capsule by mouth every morning. Max Daily Amount: 30 mg. 30 Capsule 0        No Known Allergies               ROS as per HPI      Visit Vitals  /81 (BP 1 Location: Right arm, BP Patient Position: Sitting, BP Cuff Size: Large adult)   Pulse 86   Temp 97 °F (36.1 °C) (Temporal)   Resp 16   Ht 5' 5\" (1.651 m)   Wt 260 lb 3.2 oz (118 kg)   SpO2 96%   BMI 43.30 kg/m²     Physical Exam  General appearance: alert, cooperative, no distress, appears stated age    Lungs: clear to auscultation bilaterally  Heart: regular rate and rhythm, S1, S2 normal, no murmur, click, rub or gallop    Extremities: extremities normal, atraumatic, no cyanosis or edema      Assessment/Plan:    1. Attention deficit hyperactivity disorder (ADHD), unspecified ADHD type  Stable  - amphetamine-dextroamphetamine XR (Adderall XR) 30 mg XR capsule; Take 1 Capsule by mouth every morning. Max Daily Amount: 30 mg. Dispense: 30 Capsule; Refill: 0     Refilled patient's Adderall as per orders    Follow-up and Dispositions    Return in about 6 months (around 7/24/2023) for well exam.        An After Visit Summary was printed and given to the patient.   This has been fully explained to the patient, who indicates understanding.       Juana Brandon MD

## 2023-04-03 DIAGNOSIS — F90.2 ATTENTION DEFICIT HYPERACTIVITY DISORDER (ADHD), COMBINED TYPE: Primary | ICD-10-CM

## 2023-04-03 NOTE — TELEPHONE ENCOUNTER
Patient requesting medication refill     Last seen 01/24/23   Next visit None     Pharmacy   [facility] [address] [place] [address]      amphetamine-dextroamphetamine XR (Adderall XR) 30 mg XR capsule

## 2023-04-03 NOTE — TELEPHONE ENCOUNTER
Previous Refill Encounter: 1- #30 caps with 0 refills  Last Urine Drug Screen: n/a  Controlled Substance Agreement: n/a  : Last filled on 2- for 30  d/s. Requested Prescriptions     Pending Prescriptions Disp Refills    amphetamine-dextroamphetamine (ADDERALL XR) 30 MG extended release capsule 30 capsule 0     Sig: Take 1 capsule by mouth every morning for 30 days.  Max Daily Amount: 30 mg

## 2023-04-08 RX ORDER — DEXTROAMPHETAMINE SACCHARATE, AMPHETAMINE ASPARTATE MONOHYDRATE, DEXTROAMPHETAMINE SULFATE AND AMPHETAMINE SULFATE 7.5; 7.5; 7.5; 7.5 MG/1; MG/1; MG/1; MG/1
30 CAPSULE, EXTENDED RELEASE ORAL EVERY MORNING
Qty: 30 CAPSULE | Refills: 0 | Status: SHIPPED | OUTPATIENT
Start: 2023-04-08 | End: 2023-05-08

## 2023-06-01 DIAGNOSIS — F90.2 ATTENTION DEFICIT HYPERACTIVITY DISORDER (ADHD), COMBINED TYPE: ICD-10-CM

## 2023-06-10 RX ORDER — DEXTROAMPHETAMINE SACCHARATE, AMPHETAMINE ASPARTATE MONOHYDRATE, DEXTROAMPHETAMINE SULFATE AND AMPHETAMINE SULFATE 7.5; 7.5; 7.5; 7.5 MG/1; MG/1; MG/1; MG/1
30 CAPSULE, EXTENDED RELEASE ORAL EVERY MORNING
Qty: 30 CAPSULE | Refills: 0 | Status: SHIPPED | OUTPATIENT
Start: 2023-06-10 | End: 2023-07-10

## 2023-07-10 ENCOUNTER — HOSPITAL ENCOUNTER (OUTPATIENT)
Facility: HOSPITAL | Age: 23
Discharge: HOME | End: 2023-07-13
Payer: COMMERCIAL

## 2023-07-10 DIAGNOSIS — M54.50 LOW BACK PAIN, UNSPECIFIED BACK PAIN LATERALITY, UNSPECIFIED CHRONICITY, UNSPECIFIED WHETHER SCIATICA PRESENT: ICD-10-CM

## 2023-07-10 PROCEDURE — 72110 X-RAY EXAM L-2 SPINE 4/>VWS: CPT

## 2023-07-28 DIAGNOSIS — F90.2 ATTENTION DEFICIT HYPERACTIVITY DISORDER (ADHD), COMBINED TYPE: ICD-10-CM

## 2023-07-28 NOTE — TELEPHONE ENCOUNTER
Last Urine Drug Screen: n/a  Controlled Substance Agreement: n/a  : Last filled on 06- for 30 d/s. Requested Prescriptions     Pending Prescriptions Disp Refills    amphetamine-dextroamphetamine (ADDERALL XR) 30 MG extended release capsule 30 capsule 0     Sig: Take 1 capsule by mouth every morning for 30 days.  Max Daily Amount: 30 mg

## 2023-07-28 NOTE — TELEPHONE ENCOUNTER
Last appointment: 01/24/2023    Next appointment: none    Patient requesting refill for     amphetamine-dextroamphetamine (ADDERALL XR) 30 MG extended release capsule      Pharmacy   [facility] [address], [address] [place] [address]” [address], [address] [place] [address]   Phone:  [phone]

## 2023-08-09 RX ORDER — DEXTROAMPHETAMINE SACCHARATE, AMPHETAMINE ASPARTATE MONOHYDRATE, DEXTROAMPHETAMINE SULFATE AND AMPHETAMINE SULFATE 7.5; 7.5; 7.5; 7.5 MG/1; MG/1; MG/1; MG/1
30 CAPSULE, EXTENDED RELEASE ORAL EVERY MORNING
Qty: 30 CAPSULE | Refills: 0 | Status: SHIPPED | OUTPATIENT
Start: 2023-08-09 | End: 2023-09-08

## 2023-09-07 DIAGNOSIS — F90.2 ATTENTION DEFICIT HYPERACTIVITY DISORDER (ADHD), COMBINED TYPE: ICD-10-CM

## 2023-09-07 NOTE — TELEPHONE ENCOUNTER
PATIENT REQUESTING MEDICATION REFILL     LAST IN OFFICE VISIT 01/24/23  NEXT IN OFFICE VISIT NONE    amphetamine-dextroamphetamine (ADDERALL XR) 30 MG extended release capsule    [facility] [facility] - [place] - [address]   [address], [address]   Phone:  [phone]  Fax:  [phone]

## 2023-09-07 NOTE — TELEPHONE ENCOUNTER
Last Urine Drug Screen: n/a  Controlled Substance Agreement: n/a  : Last filled on 08- for 30 d/s. Requested Prescriptions     Pending Prescriptions Disp Refills    amphetamine-dextroamphetamine (ADDERALL XR) 30 MG extended release capsule 30 capsule 0     Sig: Take 1 capsule by mouth every morning for 30 days.  Max Daily Amount: 30 mg

## 2023-09-15 RX ORDER — DEXTROAMPHETAMINE SACCHARATE, AMPHETAMINE ASPARTATE MONOHYDRATE, DEXTROAMPHETAMINE SULFATE AND AMPHETAMINE SULFATE 7.5; 7.5; 7.5; 7.5 MG/1; MG/1; MG/1; MG/1
30 CAPSULE, EXTENDED RELEASE ORAL EVERY MORNING
Qty: 30 CAPSULE | Refills: 0 | Status: SHIPPED | OUTPATIENT
Start: 2023-09-15 | End: 2023-10-15

## 2023-10-26 ENCOUNTER — TELEPHONE (OUTPATIENT)
Age: 23
End: 2023-10-26

## 2023-10-26 DIAGNOSIS — F90.2 ATTENTION DEFICIT HYPERACTIVITY DISORDER (ADHD), COMBINED TYPE: ICD-10-CM

## 2023-10-26 NOTE — TELEPHONE ENCOUNTER
Patient requesting refill for  amphetamine-dextroamphetamine (ADDERALL XR) 30 MG extended release capsule

## 2023-10-28 NOTE — TELEPHONE ENCOUNTER
[facility] : No [facility]     Last Visit: 01/24/2023  Next Appointment: N/A  Previous Refill Encounter(s): Date:       Disp Refills Start End    amphetamine-dextroamphetamine (ADDERALL XR) 30 MG extended release capsule 30 capsule 0 9/15/2023 10/15/2023    Sig - Route: Take 1 capsule by mouth every morning for 30 days. TO PATIENT:  THIS REFILL IS  APPROVED. AN APPOINTMENT WILL NEED TO BE SCHEDULED BEFORE NEXT REFILL ORDER IS  APPROVED. Max Daily Amount: 30 mg - Oral    Sent to pharmacy as: Amphetamine-Dextroamphet ER 30 MG Oral Capsule Extended Release 24 Hour (ADDERALL XR)    Earliest Fill Date: 9/15/2023    E-Prescribing Status: Receipt confirmed by pharmacy (9/15/2023 11:22 AM EDT)    Pharmacy    [place] [address], [address]   Phone:  [phone]  Fax:  [phone]           Controlled Substance Agreement: N/A  Urine Drug Screen: N/A    Requested Prescriptions     Pending Prescriptions Disp Refills    amphetamine-dextroamphetamine (ADDERALL XR) 30 MG extended release capsule 30 capsule 0     Sig: Take 1 capsule by mouth every morning for 30 days. TO PATIENT:  THIS REFILL IS  APPROVED. AN APPOINTMENT WILL NEED TO BE SCHEDULED BEFORE NEXT REFILL ORDER IS  APPROVED.  Max Daily Amount: 30 mg

## 2023-10-30 RX ORDER — DEXTROAMPHETAMINE SACCHARATE, AMPHETAMINE ASPARTATE MONOHYDRATE, DEXTROAMPHETAMINE SULFATE AND AMPHETAMINE SULFATE 7.5; 7.5; 7.5; 7.5 MG/1; MG/1; MG/1; MG/1
30 CAPSULE, EXTENDED RELEASE ORAL EVERY MORNING
Qty: 30 CAPSULE | Refills: 0 | OUTPATIENT
Start: 2023-10-30 | End: 2023-11-29

## 2023-11-01 NOTE — TELEPHONE ENCOUNTER
Patient called to check the status of refill request for Bevely Favors has been denied by provider due to overdue appt and labwork,  pt states will not schedule appt because he is broke and cannot pay copay, attempted to adv that if billed for copay he can still have appt, pt declined scheduling and stated no one told him, per last refill label was printed with appt due before next refill, multiple attempts have been made to reach the patient.  Patient declined again to schedule appt and the hung up the line

## 2023-11-08 ENCOUNTER — TELEMEDICINE (OUTPATIENT)
Age: 23
End: 2023-11-08
Payer: COMMERCIAL

## 2023-11-08 DIAGNOSIS — F90.2 ATTENTION DEFICIT HYPERACTIVITY DISORDER (ADHD), COMBINED TYPE: ICD-10-CM

## 2023-11-08 PROCEDURE — 99213 OFFICE O/P EST LOW 20 MIN: CPT | Performed by: FAMILY MEDICINE

## 2023-11-08 RX ORDER — LANOLIN ALCOHOL/MO/W.PET/CERES
3 CREAM (GRAM) TOPICAL DAILY
COMMUNITY

## 2023-11-08 RX ORDER — DEXTROAMPHETAMINE SACCHARATE, AMPHETAMINE ASPARTATE MONOHYDRATE, DEXTROAMPHETAMINE SULFATE AND AMPHETAMINE SULFATE 7.5; 7.5; 7.5; 7.5 MG/1; MG/1; MG/1; MG/1
30 CAPSULE, EXTENDED RELEASE ORAL EVERY MORNING
Qty: 30 CAPSULE | Refills: 0 | Status: SHIPPED | OUTPATIENT
Start: 2023-11-08 | End: 2023-12-08

## 2023-11-08 RX ORDER — FLUTICASONE PROPIONATE 50 MCG
SPRAY, SUSPENSION (ML) NASAL
COMMUNITY
Start: 2023-09-16

## 2023-11-08 RX ORDER — AZITHROMYCIN 250 MG/1
TABLET, FILM COATED ORAL
COMMUNITY
Start: 2023-09-16 | End: 2023-11-08 | Stop reason: ALTCHOICE

## 2023-11-08 SDOH — ECONOMIC STABILITY: INCOME INSECURITY: HOW HARD IS IT FOR YOU TO PAY FOR THE VERY BASICS LIKE FOOD, HOUSING, MEDICAL CARE, AND HEATING?: PATIENT DECLINED

## 2023-11-08 SDOH — ECONOMIC STABILITY: FOOD INSECURITY: WITHIN THE PAST 12 MONTHS, THE FOOD YOU BOUGHT JUST DIDN'T LAST AND YOU DIDN'T HAVE MONEY TO GET MORE.: PATIENT DECLINED

## 2023-11-08 SDOH — ECONOMIC STABILITY: HOUSING INSECURITY
IN THE LAST 12 MONTHS, WAS THERE A TIME WHEN YOU DID NOT HAVE A STEADY PLACE TO SLEEP OR SLEPT IN A SHELTER (INCLUDING NOW)?: PATIENT REFUSED

## 2023-11-08 SDOH — ECONOMIC STABILITY: FOOD INSECURITY: WITHIN THE PAST 12 MONTHS, YOU WORRIED THAT YOUR FOOD WOULD RUN OUT BEFORE YOU GOT MONEY TO BUY MORE.: PATIENT DECLINED

## 2023-11-08 ASSESSMENT — ANXIETY QUESTIONNAIRES
2. NOT BEING ABLE TO STOP OR CONTROL WORRYING: 0
7. FEELING AFRAID AS IF SOMETHING AWFUL MIGHT HAPPEN: 0
3. WORRYING TOO MUCH ABOUT DIFFERENT THINGS: 0
1. FEELING NERVOUS, ANXIOUS, OR ON EDGE: 0
GAD7 TOTAL SCORE: 0
IF YOU CHECKED OFF ANY PROBLEMS ON THIS QUESTIONNAIRE, HOW DIFFICULT HAVE THESE PROBLEMS MADE IT FOR YOU TO DO YOUR WORK, TAKE CARE OF THINGS AT HOME, OR GET ALONG WITH OTHER PEOPLE: NOT DIFFICULT AT ALL
6. BECOMING EASILY ANNOYED OR IRRITABLE: 0
4. TROUBLE RELAXING: 0
5. BEING SO RESTLESS THAT IT IS HARD TO SIT STILL: 0

## 2023-11-08 ASSESSMENT — PATIENT HEALTH QUESTIONNAIRE - PHQ9
SUM OF ALL RESPONSES TO PHQ9 QUESTIONS 1 & 2: 0
SUM OF ALL RESPONSES TO PHQ QUESTIONS 1-9: 0
SUM OF ALL RESPONSES TO PHQ QUESTIONS 1-9: 0
1. LITTLE INTEREST OR PLEASURE IN DOING THINGS: 0
2. FEELING DOWN, DEPRESSED OR HOPELESS: 0
SUM OF ALL RESPONSES TO PHQ QUESTIONS 1-9: 0
SUM OF ALL RESPONSES TO PHQ QUESTIONS 1-9: 0

## 2023-11-08 NOTE — PROGRESS NOTES
2023    TELEHEALTH EVALUATION -- Audio/Visual    HPI:    Richie Lincoln (:  2000) has requested an audio/video evaluation for the following concern(s):    Patient has ADHD. He has been out of his medications recently  He is in school  He states that he has failed a test twice since being off his medication. He has to retake this test for the third time this week. On his medicine he makes A's. He is tolerating the medication well. He reports no changes to his health history. Review of Systems as per HPI    Prior to Visit Medications    Medication Sig Taking? Authorizing Provider   fluticasone (FLONASE) 50 MCG/ACT nasal spray 1 spray in each nostril Nasally Once a day for 30 days Yes Provider, MD Sandy   melatonin 3 MG TABS tablet Take 1 tablet by mouth daily Yes Provider, MD Sandy   amphetamine-dextroamphetamine (ADDERALL XR) 30 MG extended release capsule Take 1 capsule by mouth every morning for 30 days.  Max Daily Amount: 30 mg Yes Michale Hammans, MD       Social History     Tobacco Use    Smoking status: Some Days     Types: Cigars     Start date: 2023    Smokeless tobacco: Never    Tobacco comments:     Occasional 1/month    Vaping Use    Vaping Use: Never used   Substance Use Topics    Alcohol use: Yes     Comment: Occasional drinking    Drug use: No        No Known Allergies,   Past Medical History:   Diagnosis Date    ADHD    , No past surgical history on file.,   Social History     Tobacco Use    Smoking status: Some Days     Types: Cigars     Start date: 2023    Smokeless tobacco: Never    Tobacco comments:     Occasional 1/month    Vaping Use    Vaping Use: Never used   Substance Use Topics    Alcohol use: Yes     Comment: Occasional drinking    Drug use: No   ,   Family History   Problem Relation Age of Onset    Cancer Paternal Grandmother     Stomach Cancer Paternal Grandfather     Cancer Paternal Grandfather     Cancer Maternal Grandmother     Lung

## 2023-11-08 NOTE — PROGRESS NOTES
Richie Lincoln, was evaluated through a synchronous (real-time) audio-video encounter. The patient (or guardian if applicable) is aware that this is a billable service, which includes applicable co-pays. This Virtual Visit was conducted with patient's (and/or legal guardian's) consent. Patient identification was verified, and a caregiver was present when appropriate.    The patient was located at Home: [address] [place] [address]  Provider was located at [facility] (Appt Dept): [address] [facility], [address] [place] [address],  [address] [place] [address]      Richie Lincoln (:  2000) is a Established patient, presenting virtually for evaluation of the following:        --Augie Elliott MA

## 2023-11-10 ENCOUNTER — TELEPHONE (OUTPATIENT)
Age: 23
End: 2023-11-10

## 2023-11-10 NOTE — TELEPHONE ENCOUNTER
Left patient voicemail to contact office to schedule follow up appointment.      Please reach out to patient to schedule a 6-month follow-up visit for a well exam.

## 2023-12-22 DIAGNOSIS — F90.2 ATTENTION DEFICIT HYPERACTIVITY DISORDER (ADHD), COMBINED TYPE: ICD-10-CM

## 2023-12-22 NOTE — TELEPHONE ENCOUNTER
Last Appointment: VV 11/8/2023  Next Appointment: none     Requested Prescription  amphetamine-dextroamphetamine (ADDERALL XR) 30 MG extended release capsule     [facility] [address] [facility] - [place] - [address], [address]  Phone: [phone]  Fax: [phone]

## 2023-12-26 RX ORDER — DEXTROAMPHETAMINE SACCHARATE, AMPHETAMINE ASPARTATE MONOHYDRATE, DEXTROAMPHETAMINE SULFATE AND AMPHETAMINE SULFATE 7.5; 7.5; 7.5; 7.5 MG/1; MG/1; MG/1; MG/1
30 CAPSULE, EXTENDED RELEASE ORAL EVERY MORNING
Qty: 30 CAPSULE | Refills: 0 | Status: SHIPPED | OUTPATIENT
Start: 2023-12-26 | End: 2024-01-25

## 2024-02-16 DIAGNOSIS — F90.2 ATTENTION DEFICIT HYPERACTIVITY DISORDER (ADHD), COMBINED TYPE: ICD-10-CM

## 2024-02-16 NOTE — TELEPHONE ENCOUNTER
Patient requesting medication refill     Last in office visit 01/24/23  Next in office visit None     amphetamine-dextroamphetamine (ADDERALL XR) 30 MG extended release capsule     [facility] [address] [facility] - [place] - [address] [place] [address] - P [phone] - F [phone]  [facility] [address]  Phone: [phone]  Fax: [phone]

## 2024-02-19 NOTE — TELEPHONE ENCOUNTER
[facility]  reports the last fill date : No Access     Controlled Substance Agreement: N/A  Urine Drug Screen: N/A  Previous Refill Encounter(s): Date:    Disp Refills Start End    amphetamine-dextroamphetamine (ADDERALL XR) 30 MG extended release capsule 30 capsule 0 12/26/2023 1/25/2024    Sig - Route: Take 1 capsule by mouth every morning for 30 days. Max Daily Amount: 30 mg - Oral    Sent to pharmacy as: Amphetamine-Dextroamphet ER 30 MG Oral Capsule Extended Release 24 Hour (ADDERALL XR)    Earliest Fill Date: 12/26/2023    E-Prescribing Status: Receipt confirmed by pharmacy (12/26/2023  1:43 PM EST)  Pharmacy    [facility] [address] [facility] - [place] - [address] [place] [address] - P [phone] - F [phone]  [address] [place] [address]  Phone: [phone]  Fax: [phone]         Requested Prescriptions     Pending Prescriptions Disp Refills    amphetamine-dextroamphetamine (ADDERALL XR) 30 MG extended release capsule 30 capsule 0     Sig: Take 1 capsule by mouth every morning for 30 days. Max Daily Amount: 30 mg

## 2024-02-21 RX ORDER — DEXTROAMPHETAMINE SACCHARATE, AMPHETAMINE ASPARTATE MONOHYDRATE, DEXTROAMPHETAMINE SULFATE AND AMPHETAMINE SULFATE 7.5; 7.5; 7.5; 7.5 MG/1; MG/1; MG/1; MG/1
30 CAPSULE, EXTENDED RELEASE ORAL EVERY MORNING
Qty: 30 CAPSULE | Refills: 0 | Status: SHIPPED | OUTPATIENT
Start: 2024-02-21 | End: 2024-03-22

## 2025-02-21 NOTE — PATIENT INSTRUCTIONS
Attention Deficit Hyperactivity Disorder (ADHD) in Adults: Care Instructions  Your Care Instructions    Attention deficit hyperactivity disorder, or ADHD, is a condition that makes it hard to pay attention. So you may have problems when you try to focus, get organized, and finish tasks. It might make you more active than other people. Or you might do things without thinking first.  ADHD is very common. It usually starts in early childhood. Many adults don't realize they have it until their children are diagnosed. Then they become aware of their own symptoms. Doctors don't know what causes ADHD. But it often runs in families. ADHD can be treated with medicines, behavior training, and counseling. Treatment can improve your life. Follow-up care is a key part of your treatment and safety. Be sure to make and go to all appointments, and call your doctor if you are having problems. It's also a good idea to know your test results and keep a list of the medicines you take. How can you care for yourself at home? · Learn all you can about ADHD. This will help you and your family understand it better. · Take your medicines exactly as prescribed. Call your doctor if you think you are having a problem with your medicine. You will get more details on the specific medicines your doctor prescribes. · If you miss a dose of your medicine, do not take an extra dose. · If your doctor suggests counseling, find a counselor you like and trust. Talk openly and honestly. Be willing to make some changes. · Find a support group for adults with ADHD. Talking to others with the same problems can help you feel better. It can also give you ideas about how to best cope with the condition. · Get rid of distractions at your work space. Keep your desk clean. Try not to face a window or busy hallway. · Use files, planners, and other tools to keep you organized. · Limit use of alcohol, and do not use illegal drugs.  People with ADHD The patient's elbows and knees were padded, heels floated, warming blanket given, and safety strap applied. tend to develop substance use disorder more easily than others. Tell your doctor if you need help to quit. Counseling, support groups, and sometimes medicines can help you stay free of alcohol or drugs. · Get at least 30 minutes of physical activity on most days of the week. Exercise has been shown to help people cope with ADHD. Walking is a good choice. You also may want to do other activities, such as running, swimming, cycling, or playing tennis or team sports. When should you call for help? Watch closely for changes in your health, and be sure to contact your doctor if:    · You feel sad a lot or cry all the time.     · You have trouble sleeping, or you sleep too much.     · You find it hard to concentrate, make decisions, or remember things.     · You change how you normally eat.     · You feel guilty for no reason. Where can you learn more? Go to http://trevorTwiiggblanca.info/. Enter B196 in the search box to learn more about \"Attention Deficit Hyperactivity Disorder (ADHD) in Adults: Care Instructions. \"  Current as of: September 11, 2018  Content Version: 12.1  © 2378-5461 [employer]. Care instructions adapted under license by [employer] (which disclaims liability or warranty for this information). If you have questions about a medical condition or this instruction, always ask your healthcare professional. [address] any warranty or liability for your use of this information. Sinusitis: Care Instructions  Your Care Instructions    Sinusitis is an infection of the lining of the sinus cavities in your head. Sinusitis often follows a cold. It causes pain and pressure in your head and face. In most cases, sinusitis gets better on its own in 1 to 2 weeks. But some mild symptoms may last for several weeks. Sometimes antibiotics are needed. Follow-up care is a key part of your treatment and safety.  Be sure to make and go to all appointments, and call your doctor if you are having problems. It's also a good idea to know your test results and keep a list of the medicines you take. How can you care for yourself at home? · Take an over-the-counter pain medicine, such as acetaminophen (Tylenol), ibuprofen (Advil, Motrin), or naproxen (Aleve). Read and follow all instructions on the label. · If the doctor prescribed antibiotics, take them as directed. Do not stop taking them just because you feel better. You need to take the full course of antibiotics. · Be careful when taking over-the-counter cold or flu medicines and Tylenol at the same time. Many of these medicines have acetaminophen, which is Tylenol. Read the labels to make sure that you are not taking more than the recommended dose. Too much acetaminophen (Tylenol) can be harmful. · Breathe warm, moist air from a steamy shower, a hot bath, or a sink filled with hot water. Avoid cold, dry air. Using a humidifier in your home may help. Follow the directions for cleaning the machine. · Use saline (saltwater) nasal washes to help keep your nasal passages open and wash out mucus and bacteria. You can buy saline nose drops at a grocery store or drugstore. Or you can make your own at home by adding 1 teaspoon of salt and 1 teaspoon of baking soda to 2 cups of distilled water. If you make your own, fill a bulb syringe with the solution, insert the tip into your nostril, and squeeze gently. Florinda Canard your nose. · Put a hot, wet towel or a warm gel pack on your face 3 or 4 times a day for 5 to 10 minutes each time. · Try a decongestant nasal spray like oxymetazoline (Afrin). Do not use it for more than 3 days in a row. Using it for more than 3 days can make your congestion worse. When should you call for help?   Call your doctor now or seek immediate medical care if:    · You have new or worse swelling or redness in your face or around your eyes.     · You have a new or higher fever.   Sergio Burgos closely for changes in your health, and be sure to contact your doctor if:    · You have new or worse facial pain.     · The mucus from your nose becomes thicker (like pus) or has new blood in it.     · You are not getting better as expected. Where can you learn more? Go to http://trevor-blanca.info/. Enter R018 in the search box to learn more about \"Sinusitis: Care Instructions. \"  Current as of: October 21, 2018  Content Version: 12.1  © 7555-0975 [employer]. Care instructions adapted under license by [employer] (which disclaims liability or warranty for this information). If you have questions about a medical condition or this instruction, always ask your healthcare professional. [address] any warranty or liability for your use of this information. Detail Level: Zone

## 2025-03-20 ENCOUNTER — TELEMEDICINE (OUTPATIENT)
Facility: CLINIC | Age: 25
End: 2025-03-20
Payer: COMMERCIAL

## 2025-03-20 DIAGNOSIS — F90.2 ATTENTION DEFICIT HYPERACTIVITY DISORDER (ADHD), COMBINED TYPE: ICD-10-CM

## 2025-03-20 PROCEDURE — 99213 OFFICE O/P EST LOW 20 MIN: CPT | Performed by: FAMILY MEDICINE

## 2025-03-20 RX ORDER — DEXTROAMPHETAMINE SACCHARATE, AMPHETAMINE ASPARTATE MONOHYDRATE, DEXTROAMPHETAMINE SULFATE AND AMPHETAMINE SULFATE 7.5; 7.5; 7.5; 7.5 MG/1; MG/1; MG/1; MG/1
30 CAPSULE, EXTENDED RELEASE ORAL EVERY MORNING
Qty: 30 CAPSULE | Refills: 0 | Status: CANCELLED | OUTPATIENT
Start: 2025-03-20 | End: 2025-04-19

## 2025-03-20 RX ORDER — DEXTROAMPHETAMINE SACCHARATE, AMPHETAMINE ASPARTATE MONOHYDRATE, DEXTROAMPHETAMINE SULFATE AND AMPHETAMINE SULFATE 7.5; 7.5; 7.5; 7.5 MG/1; MG/1; MG/1; MG/1
30 CAPSULE, EXTENDED RELEASE ORAL DAILY
Qty: 30 CAPSULE | Refills: 0 | Status: SHIPPED | OUTPATIENT
Start: 2025-05-19 | End: 2025-06-18

## 2025-03-20 RX ORDER — DEXTROAMPHETAMINE SACCHARATE, AMPHETAMINE ASPARTATE MONOHYDRATE, DEXTROAMPHETAMINE SULFATE AND AMPHETAMINE SULFATE 7.5; 7.5; 7.5; 7.5 MG/1; MG/1; MG/1; MG/1
30 CAPSULE, EXTENDED RELEASE ORAL DAILY
Qty: 30 CAPSULE | Refills: 0 | Status: SHIPPED | OUTPATIENT
Start: 2025-04-19 | End: 2025-05-19

## 2025-03-20 RX ORDER — DEXTROAMPHETAMINE SACCHARATE, AMPHETAMINE ASPARTATE MONOHYDRATE, DEXTROAMPHETAMINE SULFATE AND AMPHETAMINE SULFATE 7.5; 7.5; 7.5; 7.5 MG/1; MG/1; MG/1; MG/1
30 CAPSULE, EXTENDED RELEASE ORAL DAILY
Qty: 30 CAPSULE | Refills: 0 | Status: SHIPPED | OUTPATIENT
Start: 2025-03-20 | End: 2025-04-19

## 2025-03-20 SDOH — ECONOMIC STABILITY: TRANSPORTATION INSECURITY
IN THE PAST 12 MONTHS, HAS LACK OF TRANSPORTATION KEPT YOU FROM MEETINGS, WORK, OR FROM GETTING THINGS NEEDED FOR DAILY LIVING?: NO

## 2025-03-20 SDOH — ECONOMIC STABILITY: FOOD INSECURITY: WITHIN THE PAST 12 MONTHS, THE FOOD YOU BOUGHT JUST DIDN'T LAST AND YOU DIDN'T HAVE MONEY TO GET MORE.: NEVER TRUE

## 2025-03-20 SDOH — ECONOMIC STABILITY: INCOME INSECURITY: IN THE LAST 12 MONTHS, WAS THERE A TIME WHEN YOU WERE NOT ABLE TO PAY THE MORTGAGE OR RENT ON TIME?: PATIENT DECLINED

## 2025-03-20 SDOH — ECONOMIC STABILITY: FOOD INSECURITY: WITHIN THE PAST 12 MONTHS, YOU WORRIED THAT YOUR FOOD WOULD RUN OUT BEFORE YOU GOT MONEY TO BUY MORE.: NEVER TRUE

## 2025-03-20 SDOH — ECONOMIC STABILITY: TRANSPORTATION INSECURITY
IN THE PAST 12 MONTHS, HAS THE LACK OF TRANSPORTATION KEPT YOU FROM MEDICAL APPOINTMENTS OR FROM GETTING MEDICATIONS?: PATIENT DECLINED

## 2025-03-20 NOTE — PROGRESS NOTES
3/20/2025    TELEHEALTH EVALUATION -- Audio/Visual    HPI:    Nikita Wright (:  2000) has requested an audio/video evaluation for the following concern(s):  Patient is here to follow-up on ADHD.  He states he recently had a sinus infection that cleared up  uneventfully.  He subsequently however had the flu which improved.  He was treated with Tessalon Perles and an inhaler.  He has no history of asthma.    He is here to follow-up on his attention deficit disorder.  He has been prescribed Adderall XR.  He states that his condition is well-managed when he has his medication.  He ran out of the Adderall XR on Monday.  He has noted a significant difference in his attention span since being out of his medication.  He also reports having an intense appetite when he is out of his medication.  He confirms that his medication is effective when he takes it regularly.  He is currently on 30 mg of Adderall XR daily.        Review of Systems as per HPI    Prior to Visit Medications    Medication Sig Taking? Authorizing Provider   albuterol sulfate HFA (PROVENTIL;VENTOLIN;PROAIR) 108 (90 Base) MCG/ACT inhaler 1 puff as needed Inhalation every 4 hrs for 7 days Yes Provider, MD Sandy   benzonatate (TESSALON) 200 MG capsule as needed Yes Provider, MD Sandy   amphetamine-dextroamphetamine (ADDERALL XR) 30 MG extended release capsule Take 1 capsule by mouth every morning for 30 days. Max Daily Amount: 30 mg  Ade Liu MD     Allergies   Allergen Reactions    Promethazine-Dm      Messes with patients Restless Leg Syndrome .mrw       Social History     Tobacco Use    Smoking status: Some Days     Types: Cigars     Start date: 2023    Smokeless tobacco: Never    Tobacco comments:     Since age 23, Occasional Cigar 1/month with no intentions of quitting. mrw   Vaping Use    Vaping status: Never Used   Substance Use Topics    Alcohol use: Yes     Comment: Occasional drinking    Drug use: No

## 2025-03-20 NOTE — PROGRESS NOTES
Nikita Wright, was evaluated through a synchronous (real-time) audio-video encounter. The patient (or guardian if applicable) is aware that this is a billable service, which includes applicable co-pays. This Virtual Visit was conducted with patient's (and/or legal guardian's) consent. Patient identification was verified, and a caregiver was present when appropriate.   The patient was located at Home: [address] [place] [address]  Provider was located at Facility (Appt Dept): [address], [facility] [address]  [place] [id number]  Confirm you are appropriately licensed, registered, or certified to deliver care in the [facility] where the patient is located as indicated above. If you are not or unsure, please re-schedule the visit: Yes, I confirm.     Nikita Wright (:  2000) is a Established patient, presenting virtually for evaluation of the following:    Below is the assessment and plan developed based on review of pertinent history, physical exam, labs, studies, and medications.    --Justina Bradley MA

## 2025-03-31 ENCOUNTER — TELEPHONE (OUTPATIENT)
Facility: CLINIC | Age: 25
End: 2025-03-31

## 2025-03-31 NOTE — TELEPHONE ENCOUNTER
Left patient voicemail to call office to schedule follow up appointment.    3 to 4 months for a well exam.

## 2025-04-23 DIAGNOSIS — F90.2 ATTENTION DEFICIT HYPERACTIVITY DISORDER (ADHD), COMBINED TYPE: ICD-10-CM

## 2025-04-23 RX ORDER — DEXTROAMPHETAMINE SACCHARATE, AMPHETAMINE ASPARTATE MONOHYDRATE, DEXTROAMPHETAMINE SULFATE AND AMPHETAMINE SULFATE 7.5; 7.5; 7.5; 7.5 MG/1; MG/1; MG/1; MG/1
30 CAPSULE, EXTENDED RELEASE ORAL DAILY
Qty: 30 CAPSULE | Refills: 0 | Status: CANCELLED | OUTPATIENT
Start: 2025-04-23 | End: 2025-05-23

## 2025-05-01 DIAGNOSIS — F90.2 ATTENTION DEFICIT HYPERACTIVITY DISORDER (ADHD), COMBINED TYPE: ICD-10-CM

## 2025-05-01 RX ORDER — DEXTROAMPHETAMINE SACCHARATE, AMPHETAMINE ASPARTATE MONOHYDRATE, DEXTROAMPHETAMINE SULFATE AND AMPHETAMINE SULFATE 7.5; 7.5; 7.5; 7.5 MG/1; MG/1; MG/1; MG/1
30 CAPSULE, EXTENDED RELEASE ORAL DAILY
Qty: 30 CAPSULE | Refills: 0 | Status: CANCELLED | OUTPATIENT
Start: 2025-05-01 | End: 2025-05-31

## 2025-05-23 DIAGNOSIS — F90.2 ATTENTION DEFICIT HYPERACTIVITY DISORDER (ADHD), COMBINED TYPE: ICD-10-CM

## 2025-05-23 RX ORDER — DEXTROAMPHETAMINE SACCHARATE, AMPHETAMINE ASPARTATE MONOHYDRATE, DEXTROAMPHETAMINE SULFATE AND AMPHETAMINE SULFATE 7.5; 7.5; 7.5; 7.5 MG/1; MG/1; MG/1; MG/1
30 CAPSULE, EXTENDED RELEASE ORAL DAILY
Qty: 30 CAPSULE | Refills: 0 | Status: CANCELLED | OUTPATIENT
Start: 2025-05-23 | End: 2025-06-22

## 2025-05-27 NOTE — TELEPHONE ENCOUNTER
[facility] : No [facility]     Last Visit: 03/20/2025- Virtual, 01/24/2023- clinic  Next Appointment: N/A  Controlled Substance Agreement: N/A  Urine Drug Screen: N/A  Previous Refill Encounter(s): Date:     amphetamine-dextroamphetamine (ADDERALL XR) 30 MG extended release capsule 30 capsule 0 5/19/2025 6/18/2025    Sig - Route: Take 1 capsule by mouth daily for 30 days. Max Daily Amount: 30 mg - Oral    Sent to pharmacy as: Amphetamine-Dextroamphet ER 30 MG Oral Capsule Extended Release 24 Hour (Adderall XR)    Earliest Fill Date: 5/18/2025    E-Prescribing Status: Receipt confirmed by pharmacy (3/20/2025  7:09 PM EDT)    No prior authorization was found for this prescription.    Found prior authorization for another prescription for the same medication: Canceled - Other  Pharmacy    [facility] [address] [place] [facility] [address] [place] [address] - P [phone] - F [phone]  [address] [place] [address] [address] [address]  Phone: [phone]  Fax: [phone]          Disp Refills Start End    amphetamine-dextroamphetamine (ADDERALL XR) 30 MG extended release capsule 30 capsule 0 3/20/2025 4/19/2025    Sig - Route: Take 1 capsule by mouth daily for 30 days. Max Daily Amount: 30 mg - Oral    Sent to pharmacy as: Amphetamine-Dextroamphet ER 30 MG Oral Capsule Extended Release 24 Hour (Adderall XR)    Earliest Fill Date: 3/20/2025    E-Prescribing Status: Receipt confirmed by pharmacy (3/20/2025  7:09 PM EDT)    No prior authorization was found for this prescription.    Found prior authorization for another prescription for the same medication: Canceled - Other    amphetamine-dextroamphetamine (ADDERALL XR) 30 MG extended release capsule 30 capsule 0 4/19/2025 5/19/2025    Sig - Route: Take 1 capsule by mouth daily for 30 days. Max Daily Amount: 30 mg - Oral    Sent to pharmacy as: Amphetamine-Dextroamphet ER 30 MG Oral Capsule Extended Release 24 Hour (Adderall XR)    Earliest Fill Date:

## 2025-05-30 RX ORDER — DEXTROAMPHETAMINE SACCHARATE, AMPHETAMINE ASPARTATE MONOHYDRATE, DEXTROAMPHETAMINE SULFATE AND AMPHETAMINE SULFATE 7.5; 7.5; 7.5; 7.5 MG/1; MG/1; MG/1; MG/1
30 CAPSULE, EXTENDED RELEASE ORAL DAILY
Qty: 30 CAPSULE | Refills: 0 | Status: SHIPPED | OUTPATIENT
Start: 2025-05-30 | End: 2025-06-29

## 2025-05-30 RX ORDER — DEXTROAMPHETAMINE SACCHARATE, AMPHETAMINE ASPARTATE MONOHYDRATE, DEXTROAMPHETAMINE SULFATE AND AMPHETAMINE SULFATE 7.5; 7.5; 7.5; 7.5 MG/1; MG/1; MG/1; MG/1
30 CAPSULE, EXTENDED RELEASE ORAL DAILY
Qty: 30 CAPSULE | Refills: 0 | Status: SHIPPED | OUTPATIENT
Start: 2025-07-29 | End: 2025-08-28

## 2025-05-30 RX ORDER — DEXTROAMPHETAMINE SACCHARATE, AMPHETAMINE ASPARTATE MONOHYDRATE, DEXTROAMPHETAMINE SULFATE AND AMPHETAMINE SULFATE 7.5; 7.5; 7.5; 7.5 MG/1; MG/1; MG/1; MG/1
30 CAPSULE, EXTENDED RELEASE ORAL DAILY
Qty: 30 CAPSULE | Refills: 0 | Status: SHIPPED | OUTPATIENT
Start: 2025-06-29 | End: 2025-07-29

## 2025-07-02 DIAGNOSIS — F90.2 ATTENTION DEFICIT HYPERACTIVITY DISORDER (ADHD), COMBINED TYPE: ICD-10-CM

## 2025-07-08 RX ORDER — DEXTROAMPHETAMINE SACCHARATE, AMPHETAMINE ASPARTATE MONOHYDRATE, DEXTROAMPHETAMINE SULFATE AND AMPHETAMINE SULFATE 7.5; 7.5; 7.5; 7.5 MG/1; MG/1; MG/1; MG/1
30 CAPSULE, EXTENDED RELEASE ORAL DAILY
Qty: 30 CAPSULE | Refills: 0 | OUTPATIENT
Start: 2025-07-08 | End: 2025-08-07

## 2025-07-17 NOTE — PROGRESS NOTES
Chief Complaint   Patient presents with    Behavioral Problem       HPI:  Patient is an 24year old male presents today as new pt to establish care. Medical hx include ADHD diagnosed in 2007 and on Adderrall XR 30 mg daily with additional Adderall 5 mg at 11 am started within the last year as he was having difficulty getting focused in school. He feels well and denies CP, SOB, orthopnea, PND, fever, chills, headache, dizziness, visual disturbances, syncope, abdominal pain, diarrhea, cough, rectal bleeding, urinary symptoms, back or flank pain, sore throat, ear aches, nausea, vomiting, or other complaints today. He is a senior in a [employer] in [facility]. He denies tobacco use, ethanol use, or illicit drug use. 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 Past Medical History:   Diagnosis Date    ADHD      No Known Allergies    Current Outpatient Prescriptions   Medication Sig Dispense Refill    amphetamine-dextroamphetamine XR (ADDERALL XR) 30 mg XR capsule TAKE 1 CAPSULE BY MOUTH EVERY MORNING 30 Cap 0    dextroamphetamine-amphetamine (ADDERALL) 5 mg tablet TAKE 1 TABLET BY MOUTH AT 11:00AM 30 Tab 0     History reviewed. No pertinent surgical history.   Family History   Problem Relation Age of Onset    Cancer Maternal Grandmother     Cancer Paternal Grandmother     Stomach Cancer Paternal Grandfather     Cancer Paternal Grandfather     Lung Disease Paternal Grandfather      Social History     Social History    Marital status: SINGLE     Spouse name: N/A    Number of children: N/A    Years of education: N/A     Social History Main Topics    Smoking status: Never Smoker    Smokeless tobacco: Never Used    Alcohol use No    Drug use: No    Sexual activity: No     Other Topics Concern    None     Social History Narrative         ROS:  Constitutional: Negative for fever, fatigue, weight loss, weight gain,   HEENT: Negative for headache, dizziness, visual disturbance, nasal congestion, ear pain, sore throat  Skin: Negative for rash, bruises, lesions  Lungs:  Negative for SOB, cough, mucus production  Heart: Negative for chest pain, chest discomfort  Abdomen: Negative for abdominal pain, N/V, diarrhea, constipation  Genitourinary: Negative for pain on urination, blood in urine, penile discharge, genital lesions or sores  Neurological: Negative for numbness in extremities, tremors, confusion, speech disturbance, gait imbalance, weakness  Psychological:Negative for depression, mood changes, anxiety, sleep disturbance  Heme: Negative for easy bruisability or bleeding  Endo: Negative for heat or cold intolerance, frequency with urination, or change in appetite  Musculoskeletal; Negative for muscle or joint aches or pain. Physical Exam:  Blood pressure 114/81, pulse 74, temperature 98.3 °F (36.8 °C), temperature source Oral, resp. rate 14, height 5' 5\" (1.651 m), weight 198 lb 3.2 oz (89.9 kg), SpO2 98 %.     General: Obese male, a & o x 3, afebrile, comfortable, well-nourished, interacting appropriately, in no acute distress  HEENT: head normocephalic and atraumatic, conjuctiva clear, PERRLA, EOMI, nose clear, turbinates with no erythema or swelling, ear canals clear, no erythema or swelling, pharynx and tonsils with no erythema or exudates  Mouth: oral mucosa moist, normal appearing dentition, no dental caries, no gum swelling, no oral lesions  Skin: color race -appropriate, warm and dry, no rashes , no bruises  Neck: supple, symmetrical, no thyromegaly  Respiratory: lung sounds clear, good air entry, normal respiratory effort, no wheezes or crackles  Cardiovascular: normal S1S2, regular rate and rhythm, no murmurs, capillary refills < 2 sec, pulses palpable, no edema, no carotid or abdominal bruits, no JVD  Abdomen: non-distended, normoactive bowel sounds x 4 quadrants, soft, non-tender to palpation, no palpable mass, no hepatomegaly or splenomegaly, no guarding or rigidity, no CVA tenderness  Musculoskeletal: normal ROM on all joints, no swelling or deformity, no perilumbar tenderness, no multiple trigger points  Neurological: DTRs intact symmetrically and bilaterally  Psychological: Appropriate mood and affect      Assessment/Plan:    ICD-10-CM ICD-9-CM    1. Obesity (BMI 30.0-34. 9) E66.9 278.00 I have reviewed/discussed the above normal BMI with the patient. I have recommended the following interventions: dietary management education, guidance, and counseling, encourage exercise and monitor weight . .     2. Attention deficit hyperactivity disorder (ADHD), unspecified ADHD type F90.9 314.01 amphetamine-dextroamphetamine XR (ADDERALL XR) 30 mg XR capsule      dextroamphetamine-amphetamine (ADDERALL) 5 mg tablet   3. Need for influenza vaccination Z23 V04.81          Orders Placed This Encounter    DISCONTD: amphetamine-dextroamphetamine XR (ADDERALL XR) 30 mg XR capsule     Sig: TAKE 1 CAPSULE BY MOUTH EVERY MORNING     Refill:  0    DISCONTD: dextroamphetamine-amphetamine (ADDERALL) 5 mg tablet     Sig: TAKE 1 TABLET BY MOUTH AT 11:00AM     Refill:  0    amphetamine-dextroamphetamine XR (ADDERALL XR) 30 mg XR capsule     Sig: TAKE 1 CAPSULE BY MOUTH EVERY MORNING     Dispense:  30 Cap     Refill:  0    dextroamphetamine-amphetamine (ADDERALL) 5 mg tablet     Sig: TAKE 1 TABLET BY MOUTH AT 11:00AM     Dispense:  30 Tab     Refill:  0         Additional Notes: Discussed related screening tests, preventive exams, importance of therapeutic lifestyle  changes ( diet/exercise/weight management) . Weight Management: Counseled  After Visit Summary: Provided and discussed printed patient instructions. Questions answered accordingly. Follow-up Disposition:  In 3 months        [address] Kale Delgadillo DO, MPH  [facility]